# Patient Record
Sex: FEMALE | Race: WHITE | NOT HISPANIC OR LATINO | Employment: OTHER | URBAN - METROPOLITAN AREA
[De-identification: names, ages, dates, MRNs, and addresses within clinical notes are randomized per-mention and may not be internally consistent; named-entity substitution may affect disease eponyms.]

---

## 2022-08-10 ENCOUNTER — APPOINTMENT (OUTPATIENT)
Dept: RADIOLOGY | Facility: MEDICAL CENTER | Age: 72
DRG: 116 | End: 2022-08-10
Attending: EMERGENCY MEDICINE
Payer: MEDICARE

## 2022-08-10 ENCOUNTER — APPOINTMENT (OUTPATIENT)
Dept: RADIOLOGY | Facility: MEDICAL CENTER | Age: 72
DRG: 116 | End: 2022-08-10
Attending: HOSPITALIST
Payer: MEDICARE

## 2022-08-10 ENCOUNTER — APPOINTMENT (OUTPATIENT)
Dept: RADIOLOGY | Facility: MEDICAL CENTER | Age: 72
DRG: 116 | End: 2022-08-10
Attending: STUDENT IN AN ORGANIZED HEALTH CARE EDUCATION/TRAINING PROGRAM
Payer: MEDICARE

## 2022-08-10 ENCOUNTER — HOSPITAL ENCOUNTER (INPATIENT)
Facility: MEDICAL CENTER | Age: 72
LOS: 5 days | DRG: 116 | End: 2022-08-15
Attending: EMERGENCY MEDICINE | Admitting: STUDENT IN AN ORGANIZED HEALTH CARE EDUCATION/TRAINING PROGRAM
Payer: MEDICARE

## 2022-08-10 DIAGNOSIS — R41.89 THOUGHT DISORDER: ICD-10-CM

## 2022-08-10 DIAGNOSIS — H54.62 VISION LOSS OF LEFT EYE: ICD-10-CM

## 2022-08-10 DIAGNOSIS — H44.002: ICD-10-CM

## 2022-08-10 DIAGNOSIS — H57.12 LEFT EYE PAIN: ICD-10-CM

## 2022-08-10 DIAGNOSIS — H44.002 LEFT ENDOPHTHALMIA: ICD-10-CM

## 2022-08-10 DIAGNOSIS — F22 DELUSIONAL DISORDER (HCC): ICD-10-CM

## 2022-08-10 PROBLEM — F15.10 STIMULANT ABUSE (HCC): Status: ACTIVE | Noted: 2022-08-10

## 2022-08-10 PROBLEM — S05.02XA ABRASION OF LEFT CORNEA: Status: ACTIVE | Noted: 2022-08-10

## 2022-08-10 LAB
ALBUMIN SERPL BCP-MCNC: 3.8 G/DL (ref 3.2–4.9)
ALBUMIN/GLOB SERPL: 1.4 G/DL
ALP SERPL-CCNC: 108 U/L (ref 30–99)
ALT SERPL-CCNC: 14 U/L (ref 2–50)
AMPHET UR QL SCN: POSITIVE
ANION GAP SERPL CALC-SCNC: 10 MMOL/L (ref 7–16)
APPEARANCE UR: CLEAR
APTT PPP: 25.1 SEC (ref 24.7–36)
AST SERPL-CCNC: 16 U/L (ref 12–45)
BACTERIA #/AREA URNS HPF: ABNORMAL /HPF
BARBITURATES UR QL SCN: NEGATIVE
BASOPHILS # BLD AUTO: 0.4 % (ref 0–1.8)
BASOPHILS # BLD: 0.03 K/UL (ref 0–0.12)
BENZODIAZ UR QL SCN: NEGATIVE
BILIRUB SERPL-MCNC: 0.6 MG/DL (ref 0.1–1.5)
BILIRUB UR QL STRIP.AUTO: NEGATIVE
BUN SERPL-MCNC: 12 MG/DL (ref 8–22)
BZE UR QL SCN: NEGATIVE
CALCIUM SERPL-MCNC: 8.9 MG/DL (ref 8.5–10.5)
CANNABINOIDS UR QL SCN: NEGATIVE
CHLORIDE SERPL-SCNC: 103 MMOL/L (ref 96–112)
CHOLEST SERPL-MCNC: 167 MG/DL (ref 100–199)
CO2 SERPL-SCNC: 28 MMOL/L (ref 20–33)
COLOR UR: YELLOW
CREAT SERPL-MCNC: 0.7 MG/DL (ref 0.5–1.4)
CRP SERPL HS-MCNC: 0.5 MG/DL (ref 0–0.75)
EKG IMPRESSION: NORMAL
EOSINOPHIL # BLD AUTO: 0.05 K/UL (ref 0–0.51)
EOSINOPHIL NFR BLD: 0.7 % (ref 0–6.9)
EPI CELLS #/AREA URNS HPF: ABNORMAL /HPF
ERYTHROCYTE [DISTWIDTH] IN BLOOD BY AUTOMATED COUNT: 47.2 FL (ref 35.9–50)
EST. AVERAGE GLUCOSE BLD GHB EST-MCNC: 126 MG/DL
GFR SERPLBLD CREATININE-BSD FMLA CKD-EPI: 93 ML/MIN/1.73 M 2
GLOBULIN SER CALC-MCNC: 2.7 G/DL (ref 1.9–3.5)
GLUCOSE SERPL-MCNC: 81 MG/DL (ref 65–99)
GLUCOSE UR STRIP.AUTO-MCNC: NEGATIVE MG/DL
HBA1C MFR BLD: 6 % (ref 4–5.6)
HCT VFR BLD AUTO: 41 % (ref 37–47)
HDLC SERPL-MCNC: 74 MG/DL
HGB BLD-MCNC: 13.2 G/DL (ref 12–16)
HIV 1+2 AB+HIV1 P24 AG SERPL QL IA: NORMAL
HYALINE CASTS #/AREA URNS LPF: ABNORMAL /LPF
IMM GRANULOCYTES # BLD AUTO: 0.02 K/UL (ref 0–0.11)
IMM GRANULOCYTES NFR BLD AUTO: 0.3 % (ref 0–0.9)
INR PPP: 1.09 (ref 0.87–1.13)
KETONES UR STRIP.AUTO-MCNC: NEGATIVE MG/DL
LDLC SERPL CALC-MCNC: 79 MG/DL
LEUKOCYTE ESTERASE UR QL STRIP.AUTO: ABNORMAL
LYMPHOCYTES # BLD AUTO: 2.21 K/UL (ref 1–4.8)
LYMPHOCYTES NFR BLD: 29.8 % (ref 22–41)
MCH RBC QN AUTO: 29.9 PG (ref 27–33)
MCHC RBC AUTO-ENTMCNC: 32.2 G/DL (ref 33.6–35)
MCV RBC AUTO: 93 FL (ref 81.4–97.8)
METHADONE UR QL SCN: NEGATIVE
MICRO URNS: ABNORMAL
MONOCYTES # BLD AUTO: 0.52 K/UL (ref 0–0.85)
MONOCYTES NFR BLD AUTO: 7 % (ref 0–13.4)
NEUTROPHILS # BLD AUTO: 4.58 K/UL (ref 2–7.15)
NEUTROPHILS NFR BLD: 61.8 % (ref 44–72)
NITRITE UR QL STRIP.AUTO: NEGATIVE
NRBC # BLD AUTO: 0 K/UL
NRBC BLD-RTO: 0 /100 WBC
OPIATES UR QL SCN: NEGATIVE
OXYCODONE UR QL SCN: NEGATIVE
PCP UR QL SCN: NEGATIVE
PH UR STRIP.AUTO: 6.5 [PH] (ref 5–8)
PLATELET # BLD AUTO: 391 K/UL (ref 164–446)
PMV BLD AUTO: 8.4 FL (ref 9–12.9)
POTASSIUM SERPL-SCNC: 3.9 MMOL/L (ref 3.6–5.5)
PROPOXYPH UR QL SCN: NEGATIVE
PROT SERPL-MCNC: 6.5 G/DL (ref 6–8.2)
PROT UR QL STRIP: NEGATIVE MG/DL
PROTHROMBIN TIME: 14 SEC (ref 12–14.6)
RBC # BLD AUTO: 4.41 M/UL (ref 4.2–5.4)
RBC # URNS HPF: ABNORMAL /HPF
RBC UR QL AUTO: NEGATIVE
SCCMEC + MECA PNL NOSE NAA+PROBE: NEGATIVE
SODIUM SERPL-SCNC: 141 MMOL/L (ref 135–145)
SP GR UR STRIP.AUTO: 1.01
T PALLIDUM AB SER QL IA: NORMAL
TRIGL SERPL-MCNC: 71 MG/DL (ref 0–149)
TSH SERPL DL<=0.005 MIU/L-ACNC: 2.78 UIU/ML (ref 0.38–5.33)
UFH PPP CHRO-ACNC: <0.1 IU/ML
UROBILINOGEN UR STRIP.AUTO-MCNC: 0.2 MG/DL
VIT B12 SERPL-MCNC: 428 PG/ML (ref 211–911)
WBC # BLD AUTO: 7.4 K/UL (ref 4.8–10.8)
WBC #/AREA URNS HPF: ABNORMAL /HPF

## 2022-08-10 PROCEDURE — 80307 DRUG TEST PRSMV CHEM ANLYZR: CPT

## 2022-08-10 PROCEDURE — 99223 1ST HOSP IP/OBS HIGH 75: CPT | Mod: GC | Performed by: PSYCHIATRY & NEUROLOGY

## 2022-08-10 PROCEDURE — 87641 MR-STAPH DNA AMP PROBE: CPT

## 2022-08-10 PROCEDURE — 84443 ASSAY THYROID STIM HORMONE: CPT

## 2022-08-10 PROCEDURE — 96365 THER/PROPH/DIAG IV INF INIT: CPT | Mod: XU

## 2022-08-10 PROCEDURE — A9270 NON-COVERED ITEM OR SERVICE: HCPCS | Performed by: PSYCHIATRY & NEUROLOGY

## 2022-08-10 PROCEDURE — 85025 COMPLETE CBC W/AUTO DIFF WBC: CPT

## 2022-08-10 PROCEDURE — 70450 CT HEAD/BRAIN W/O DYE: CPT

## 2022-08-10 PROCEDURE — 85520 HEPARIN ASSAY: CPT

## 2022-08-10 PROCEDURE — 700111 HCHG RX REV CODE 636 W/ 250 OVERRIDE (IP): Performed by: EMERGENCY MEDICINE

## 2022-08-10 PROCEDURE — 70481 CT ORBIT/EAR/FOSSA W/DYE: CPT

## 2022-08-10 PROCEDURE — 700117 HCHG RX CONTRAST REV CODE 255: Performed by: EMERGENCY MEDICINE

## 2022-08-10 PROCEDURE — 86780 TREPONEMA PALLIDUM: CPT

## 2022-08-10 PROCEDURE — 700101 HCHG RX REV CODE 250: Performed by: OPHTHALMOLOGY

## 2022-08-10 PROCEDURE — 770001 HCHG ROOM/CARE - MED/SURG/GYN PRIV*

## 2022-08-10 PROCEDURE — 85730 THROMBOPLASTIN TIME PARTIAL: CPT

## 2022-08-10 PROCEDURE — 86140 C-REACTIVE PROTEIN: CPT

## 2022-08-10 PROCEDURE — 96366 THER/PROPH/DIAG IV INF ADDON: CPT

## 2022-08-10 PROCEDURE — 99285 EMERGENCY DEPT VISIT HI MDM: CPT

## 2022-08-10 PROCEDURE — 81001 URINALYSIS AUTO W/SCOPE: CPT | Mod: XU

## 2022-08-10 PROCEDURE — 700111 HCHG RX REV CODE 636 W/ 250 OVERRIDE (IP): Performed by: HOSPITALIST

## 2022-08-10 PROCEDURE — 80053 COMPREHEN METABOLIC PANEL: CPT

## 2022-08-10 PROCEDURE — 700105 HCHG RX REV CODE 258: Performed by: EMERGENCY MEDICINE

## 2022-08-10 PROCEDURE — 82607 VITAMIN B-12: CPT

## 2022-08-10 PROCEDURE — 700102 HCHG RX REV CODE 250 W/ 637 OVERRIDE(OP): Performed by: PSYCHIATRY & NEUROLOGY

## 2022-08-10 PROCEDURE — 80061 LIPID PANEL: CPT

## 2022-08-10 PROCEDURE — 36415 COLL VENOUS BLD VENIPUNCTURE: CPT

## 2022-08-10 PROCEDURE — 93005 ELECTROCARDIOGRAM TRACING: CPT | Performed by: STUDENT IN AN ORGANIZED HEALTH CARE EDUCATION/TRAINING PROGRAM

## 2022-08-10 PROCEDURE — 85610 PROTHROMBIN TIME: CPT

## 2022-08-10 PROCEDURE — 87389 HIV-1 AG W/HIV-1&-2 AB AG IA: CPT

## 2022-08-10 PROCEDURE — 96375 TX/PRO/DX INJ NEW DRUG ADDON: CPT | Mod: XU

## 2022-08-10 PROCEDURE — 83036 HEMOGLOBIN GLYCOSYLATED A1C: CPT

## 2022-08-10 PROCEDURE — 700111 HCHG RX REV CODE 636 W/ 250 OVERRIDE (IP): Performed by: OPHTHALMOLOGY

## 2022-08-10 PROCEDURE — 99223 1ST HOSP IP/OBS HIGH 75: CPT | Mod: GC | Performed by: STUDENT IN AN ORGANIZED HEALTH CARE EDUCATION/TRAINING PROGRAM

## 2022-08-10 RX ORDER — HALOPERIDOL 5 MG/ML
5 INJECTION INTRAMUSCULAR ONCE
Status: COMPLETED | OUTPATIENT
Start: 2022-08-10 | End: 2022-08-10

## 2022-08-10 RX ORDER — MOXIFLOXACIN 5 MG/ML
1 SOLUTION/ DROPS OPHTHALMIC
Status: DISPENSED | OUTPATIENT
Start: 2022-08-10 | End: 2022-08-12

## 2022-08-10 RX ORDER — HEPARIN SODIUM 1000 [USP'U]/ML
80 INJECTION, SOLUTION INTRAVENOUS; SUBCUTANEOUS ONCE
Status: COMPLETED | OUTPATIENT
Start: 2022-08-10 | End: 2022-08-10

## 2022-08-10 RX ORDER — HEPARIN SODIUM 1000 [USP'U]/ML
40 INJECTION, SOLUTION INTRAVENOUS; SUBCUTANEOUS PRN
Status: DISCONTINUED | OUTPATIENT
Start: 2022-08-10 | End: 2022-08-11

## 2022-08-10 RX ORDER — OXYCODONE HYDROCHLORIDE 5 MG/1
5-10 TABLET ORAL EVERY 4 HOURS PRN
Status: DISCONTINUED | OUTPATIENT
Start: 2022-08-10 | End: 2022-08-15 | Stop reason: HOSPADM

## 2022-08-10 RX ORDER — HEPARIN SODIUM 5000 [USP'U]/100ML
0-30 INJECTION, SOLUTION INTRAVENOUS CONTINUOUS
Status: DISCONTINUED | OUTPATIENT
Start: 2022-08-10 | End: 2022-08-11

## 2022-08-10 RX ORDER — ACETAZOLAMIDE 500 MG/5ML
500 INJECTION, POWDER, LYOPHILIZED, FOR SOLUTION INTRAVENOUS 2 TIMES DAILY
Status: DISPENSED | OUTPATIENT
Start: 2022-08-10 | End: 2022-08-12

## 2022-08-10 RX ORDER — ONDANSETRON 2 MG/ML
4 INJECTION INTRAMUSCULAR; INTRAVENOUS EVERY 4 HOURS PRN
Status: DISCONTINUED | OUTPATIENT
Start: 2022-08-10 | End: 2022-08-15 | Stop reason: HOSPADM

## 2022-08-10 RX ORDER — ACETAMINOPHEN 325 MG/1
650 TABLET ORAL EVERY 4 HOURS PRN
Status: DISCONTINUED | OUTPATIENT
Start: 2022-08-10 | End: 2022-08-15 | Stop reason: HOSPADM

## 2022-08-10 RX ORDER — ACETAZOLAMIDE 500 MG/5ML
500 INJECTION, POWDER, LYOPHILIZED, FOR SOLUTION INTRAVENOUS ONCE
Status: DISCONTINUED | OUTPATIENT
Start: 2022-08-10 | End: 2022-08-10

## 2022-08-10 RX ORDER — SODIUM CHLORIDE 9 MG/ML
1000 INJECTION, SOLUTION INTRAVENOUS ONCE
Status: COMPLETED | OUTPATIENT
Start: 2022-08-10 | End: 2022-08-10

## 2022-08-10 RX ORDER — HALOPERIDOL 5 MG/ML
5 INJECTION INTRAMUSCULAR EVERY 6 HOURS PRN
Status: DISCONTINUED | OUTPATIENT
Start: 2022-08-10 | End: 2022-08-15 | Stop reason: HOSPADM

## 2022-08-10 RX ORDER — MOXIFLOXACIN 5 MG/ML
1 SOLUTION/ DROPS OPHTHALMIC
Status: DISCONTINUED | OUTPATIENT
Start: 2022-08-10 | End: 2022-08-10

## 2022-08-10 RX ORDER — ARIPIPRAZOLE 10 MG/1
5 TABLET ORAL DAILY
Status: DISCONTINUED | OUTPATIENT
Start: 2022-08-10 | End: 2022-08-15 | Stop reason: HOSPADM

## 2022-08-10 RX ORDER — HEPARIN SODIUM 5000 [USP'U]/100ML
0-30 INJECTION, SOLUTION INTRAVENOUS CONTINUOUS
Status: DISCONTINUED | OUTPATIENT
Start: 2022-08-10 | End: 2022-08-10

## 2022-08-10 RX ADMIN — ARIPIPRAZOLE 5 MG: 10 TABLET ORAL at 14:21

## 2022-08-10 RX ADMIN — MOXIFLOXACIN HYDROCHLORIDE 1 DROP: 5 SOLUTION/ DROPS OPHTHALMIC at 14:45

## 2022-08-10 RX ADMIN — ACETAZOLAMIDE 500 MG: 500 INJECTION, POWDER, LYOPHILIZED, FOR SOLUTION INTRAVENOUS at 15:16

## 2022-08-10 RX ADMIN — HALOPERIDOL LACTATE 5 MG: 5 INJECTION, SOLUTION INTRAMUSCULAR at 03:45

## 2022-08-10 RX ADMIN — SODIUM CHLORIDE 1000 ML: 9 INJECTION, SOLUTION INTRAVENOUS at 03:45

## 2022-08-10 RX ADMIN — HEPARIN SODIUM 5100 UNITS: 1000 INJECTION, SOLUTION INTRAVENOUS; SUBCUTANEOUS at 22:41

## 2022-08-10 RX ADMIN — HEPARIN SODIUM 18 UNITS/KG/HR: 5000 INJECTION, SOLUTION INTRAVENOUS at 22:15

## 2022-08-10 RX ADMIN — CEFTRIAXONE SODIUM 2 G: 10 INJECTION, POWDER, FOR SOLUTION INTRAVENOUS at 03:51

## 2022-08-10 RX ADMIN — VANCOMYCIN HYDROCHLORIDE 1500 MG: 500 INJECTION, POWDER, LYOPHILIZED, FOR SOLUTION INTRAVENOUS at 04:32

## 2022-08-10 RX ADMIN — IOHEXOL 70 ML: 350 INJECTION, SOLUTION INTRAVENOUS at 05:47

## 2022-08-10 ASSESSMENT — LIFESTYLE VARIABLES
HAVE PEOPLE ANNOYED YOU BY CRITICIZING YOUR DRINKING: NO
EVER HAD A DRINK FIRST THING IN THE MORNING TO STEADY YOUR NERVES TO GET RID OF A HANGOVER: YES
EVER FELT BAD OR GUILTY ABOUT YOUR DRINKING: NO
DOES PATIENT WANT TO STOP DRINKING: NO
ALCOHOL_USE: YES
TOTAL SCORE: 1
CONSUMPTION TOTAL: INCOMPLETE
TOTAL SCORE: 1
TOTAL SCORE: 1
HAVE YOU EVER FELT YOU SHOULD CUT DOWN ON YOUR DRINKING: NO

## 2022-08-10 ASSESSMENT — ENCOUNTER SYMPTOMS
COUGH: 0
FEVER: 0
PALPITATIONS: 0
DIZZINESS: 0
SORE THROAT: 0
PHOTOPHOBIA: 0
EYE PAIN: 1
VOMITING: 0
EYE PAIN: 0
BLURRED VISION: 1
CONSTIPATION: 0
HEADACHES: 0
EYE REDNESS: 1
ABDOMINAL PAIN: 0
EYE DISCHARGE: 1
BLOOD IN STOOL: 0
SHORTNESS OF BREATH: 0
HEMOPTYSIS: 0
PHOTOPHOBIA: 1
DIARRHEA: 0
NAUSEA: 0
LOSS OF CONSCIOUSNESS: 0
DOUBLE VISION: 0
TREMORS: 0
CHILLS: 0
BACK PAIN: 0

## 2022-08-10 ASSESSMENT — PAIN DESCRIPTION - PAIN TYPE: TYPE: ACUTE PAIN

## 2022-08-10 ASSESSMENT — COGNITIVE AND FUNCTIONAL STATUS - GENERAL
SUGGESTED CMS G CODE MODIFIER DAILY ACTIVITY: CH
SUGGESTED CMS G CODE MODIFIER MOBILITY: CI
WALKING IN HOSPITAL ROOM: A LITTLE
DAILY ACTIVITIY SCORE: 24
MOBILITY SCORE: 23

## 2022-08-10 ASSESSMENT — PATIENT HEALTH QUESTIONNAIRE - PHQ9
1. LITTLE INTEREST OR PLEASURE IN DOING THINGS: NOT AT ALL
SUM OF ALL RESPONSES TO PHQ9 QUESTIONS 1 AND 2: 0
2. FEELING DOWN, DEPRESSED, IRRITABLE, OR HOPELESS: NOT AT ALL

## 2022-08-10 ASSESSMENT — FIBROSIS 4 INDEX: FIB4 SCORE: 0.77

## 2022-08-10 ASSESSMENT — PAIN DESCRIPTION - DESCRIPTORS: DESCRIPTORS: ACHING

## 2022-08-10 NOTE — CONSULTS
"Ophthalmology Consults   CC: Loss of vision left eye    HPI : 72-year-old female who initially presented to the [facility] with loss of vision in her left eye. Pt reports loss of vision her left eye several months ago due to \"parasite in her eye.\" Denies any prior eye surgeries and procedures. No issues in the right eye.         Imaging:    CT Orbits  IMPRESSION:     1.  Relative hyperenhancement of the left superior ophthalmic vein could reflect caroticocavernous fistula or thrombosis of the right superior ophthalmic vein.  2.  Gas throughout multiple vascular structures likely related to aggressive IV access.  3.  6 mm gas collection between the lateral and inferior rectus muscles in the left orbit could represent infection or large collection of gas within a vessel.  4.  Globes are symmetric.      Eye exam:  Pt is very sleepy with poor effort     VA near card 20/60 OD           HM at face OS  EOM Full OD with some restriction OS in all directions  VF to CF Full OD  Not able OS       External exam.   L/L WNL OD, +1 proptosis OS       C/S White and quite OD,  +3 chemosis with injection OS  Cornea: Clear OD, microcystic edema with several bullae and large central epi defect OS  Iris: flat and round OU  A/C: formed OD large formed hypopyon      DFE OU:  OD: WNL  OS: No view               A/P:  1. Loss of vision, left eye  - uncrear etiology  - will proceed with close observation.  -given significant microcystic edema and bullae will start on Diamox 500mg PO BID    2. Corneal abrasion, left eye  -will start with Vigamox Q2H     3. Possible fistula or thrombosis of the right superior ophthalmic vein found on CT  -will proceed with MRV      Will re-evaluet pt tmrw       -   "

## 2022-08-10 NOTE — ASSESSMENT & PLAN NOTE
UTox + amphetamines  Clinically is having some withdrawal sx's  Cont to manage supportively  Psych following

## 2022-08-10 NOTE — ASSESSMENT & PLAN NOTE
Per chart, patient concerned about parasitic infections of abdomen, eyes, body  -labs not suggestive of active parasitic infective process  PLAN  -psychiatric precautions, no sharps when diet eventually initiated  -On legal hold.  Will be transferred to inpatient psych once medically clear

## 2022-08-10 NOTE — CONSULTS
Alert Team:    Patient to be admitted to hospital; IP Psychiatry Consult ordered for further f/u later this morning.

## 2022-08-10 NOTE — H&P
History & Physical Note    Date of Admission: 8/10/2022  Admission Status: Inpatient  [facility] Team: ABDELRAHMAN  Attending: Margarito Parisi M.D.   Senior Resident: Dr. Scherer  Contact Number: [phone]    Chief Complaint: Left eye pain/ vision loss    History of Present Illness (HPI):   Mora is a 72 y.o. female who presented 8/10/2022 with left eye pain and vision loss. History taking is limited as patient received haldol prior to interview and was minimally responsive. History from chart review.     Per ED notes patient was concerned that she had parasitic infections of her skin, abdomen and eyes and that over the course of this past week had vision loss in th left eye. Per chart patient had been washing her eye out using a lice shampoo.   She was given doses of vancomycin and ceftriaxone.  While in the ED ophthalmology was consulted, per ED notes they recommended continue empiric antibiotics , keep patient NPO with plan for OR optho in the AM, imaging ordered.    Review of Systems:   Review of Systems   Unable to perform ROS: Patient unresponsive       Past Medical History:   Past Medical History was not reviewed with patient.   has a past medical history of Psychiatric disorder.    Past Surgical History: Past Surgical History was not reviewed with patient.   has no past surgical history on file.    Medications: Medications have been not reviewed with patient.  None        Allergies: Allergies have been not reviewed with patient.  Allergies   Allergen Reactions    Augmentin [Amoxicillin-Pot Clavulanate] Anaphylaxis    Flagyl [Metronidazole] Anaphylaxis    Miralax [Polyethylene Glycol] Anaphylaxis       Family History:   family history is not on file.     Social History:   Patient not responsive    Physical Exam:   Vitals:  Temp:  [36.4 °C (97.5 °F)] 36.4 °C (97.5 °F)  Pulse:  [73] 73  Resp:  [17] 17  BP: (142)/(70) 142/70  SpO2:  [95 %] 95 %    Physical Exam  Vitals and nursing note reviewed.   Constitutional:        Comments: Status post haldol, minimally responsive, arouses slightly to voice before falling back asleep   HENT:      Head: Normocephalic and atraumatic.   Eyes:      Comments: Left eye erythematous, hypopyon present on left eye.   Cardiovascular:      Rate and Rhythm: Normal rate.   Pulmonary:      Effort: Pulmonary effort is normal. No respiratory distress.   Abdominal:      Palpations: Abdomen is soft. There is no mass.   Musculoskeletal:      Right lower leg: No edema.      Left lower leg: No edema.   Skin:     General: Skin is warm and dry.   Neurological:      Comments: Minimally responsive, rouses briefly to name, does not follow commands       Labs:    Latest Reference Range & Units 8/10/22 03:04   WBC 4.8 - 10.8 K/uL 7.4   RBC 4.20 - 5.40 M/uL 4.41   Hemoglobin 12.0 - 16.0 g/dL 13.2   Hematocrit 37.0 - 47.0 % 41.0   MCV 81.4 - 97.8 fL 93.0   MCH 27.0 - 33.0 pg 29.9   MCHC 33.6 - 35.0 g/dL 32.2 (L)   RDW 35.9 - 50.0 fL 47.2   Platelet Count 164 - 446 K/uL 391   MPV 9.0 - 12.9 fL 8.4 (L)   Neutrophils-Polys 44.00 - 72.00 % 61.80   Neutrophils (Absolute) 2.00 - 7.15 K/uL 4.58   Lymphocytes 22.00 - 41.00 % 29.80   Lymphs (Absolute) 1.00 - 4.80 K/uL 2.21   Monocytes 0.00 - 13.40 % 7.00   Monos (Absolute) 0.00 - 0.85 K/uL 0.52   Eosinophils 0.00 - 6.90 % 0.70   Eos (Absolute) 0.00 - 0.51 K/uL 0.05   Basophils 0.00 - 1.80 % 0.40   Baso (Absolute) 0.00 - 0.12 K/uL 0.03   Immature Granulocytes 0.00 - 0.90 % 0.30   Immature Granulocytes (abs) 0.00 - 0.11 K/uL 0.02   Nucleated RBC /100 WBC 0.00   NRBC (Absolute) K/uL 0.00   Sodium 135 - 145 mmol/L 141   Potassium 3.6 - 5.5 mmol/L 3.9   Chloride 96 - 112 mmol/L 103   Co2 20 - 33 mmol/L 28   Anion Gap 7.0 - 16.0  10.0   Glucose 65 - 99 mg/dL 81   Bun 8 - 22 mg/dL 12   Creatinine 0.50 - 1.40 mg/dL 0.70   GFR (CKD-EPI) >60 mL/min/1.73 m 2 93   Calcium 8.5 - 10.5 mg/dL 8.9   AST(SGOT) 12 - 45 U/L 16   ALT(SGPT) 2 - 50 U/L 14   Alkaline Phosphatase 30 - 99 U/L 108 (H)    Total Bilirubin 0.1 - 1.5 mg/dL 0.6   Albumin 3.2 - 4.9 g/dL 3.8   Total Protein 6.0 - 8.2 g/dL 6.5   Globulin 1.9 - 3.5 g/dL 2.7   A-G Ratio g/dL 1.4   Stat C-Reactive Protein 0.00 - 0.75 mg/dL 0.50   (L): Data is abnormally low  (H): Data is abnormally high    Imaging:   [id number] WITH PLUS RECONS    (Results Pending)       Previous Data Review: reviewed    Problem Representation: 72 year old woman with recent history of lice shampoo exposure to eye in the context of patient concerns of parasitic infection of her eyes, concerning for possible endophthalmitis.     * Endophthalmia, left- (present on admission)  Assessment & Plan  -history of reported lice shampoo exposure to eye in context of thoughts of parasites in her eyes  -received vancomycin and ceftriaxone in the ED  PLAN  -Optho onboard  -NPO  -CT orbits pending  -continue vancomycin, defer decision on additional doses of ceftriaxone following OR  -will likely require intravitreal antibiotics, defer to optho    Thought disorder  Assessment & Plan  Per chart, patient concerned about parasitic infections of abdomen, eyes, body  -labs not suggestive of active parasitic infective process  PLAN  -psychiatric precautions, no sharps when diet eventually initiated
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
## 2022-08-10 NOTE — CONSULTS
BRIEF PSYCHIATRY NOTE:full note to follow    Pt injured her eye/vision with lice shampoo in the context of delusional parasitosis. Pt's UDS is also positive for amphetamine. She also admitted recent use of psychedelics. Pt at this time has an acute elevated risk for danger to self due to ongoing psychosis and needs psychiatric stabilization for her safety.      Dx: delusional parasitosis  Methamphetamine intoxication/withdrawal  R/o substance induced psychotic disorder    Plan:  Legal hold initiated and extended  Start Abilify 5mg Po daily to target psychosis  Please transfer pt to inpatient psychiatric hospital once she is medically cleared and a bed is available  Psychiatry will follow      Sitter: yes   Phone: yes  Visitors: yes  Personal belongings: no

## 2022-08-10 NOTE — ED TRIAGE NOTES
Chief Complaint   Patient presents with    Eye Pain     Pt bib ems from [facility] for pt having L eye pain/vision loss for a few weeks now d/t pt washing her eye out with lice shampoo because she feels there are bugs in her eye.

## 2022-08-10 NOTE — PROGRESS NOTES
[facility] Medicine Daily Progress Note    Date of Service  8/10/2022    Chief Complaint  Mora Guzman is a 72 y.o. female admitted 8/10/2022 with     Hospital Course  72-year-old female who initially presented to the [facility].  Patient with previously psychiatric diagnoses however history is difficult to obtain as she is not a good historian and further records are unavailable.  Unclear if pt has actually had lice or delusions of parasitosis.  Hx of amphetamine abuse.   presented with complaint of eye pain after using lice shampoo.  In ED found to have endophthalmitis.  Ophthalmology consulted, and started on empiric antibiotics.    Interval Problem Update  Pt ROS is limited by underlying psychiatric Dx.  She initially has no complaints but when I ask her about her vision states she can only see bright lights and some shadows from her L eye.  She denies pain however    DW Opthalmology Dr Carlson    I have discussed this patient's plan of care and discharge plan at IDT rounds today with Case Management, Nursing, Nursing leadership, and other members of the IDT team.    Consultants/Specialty  ophthalmology    Code Status  Full Code    Disposition  Patient is not medically cleared for discharge.   Anticipate discharge to to home with close outpatient follow-up.  I have placed the appropriate orders for post-discharge needs.    Review of Systems  Review of Systems   Unable to perform ROS: Psychiatric disorder   Constitutional:  Negative for chills and fever.   HENT:  Negative for nosebleeds and sore throat.    Eyes:  Positive for blurred vision, discharge and redness. Negative for double vision, photophobia and pain.   Respiratory:  Negative for cough and shortness of breath.    Cardiovascular:  Negative for chest pain, palpitations and leg swelling.   Gastrointestinal:  Negative for abdominal pain, diarrhea, nausea and vomiting.   Genitourinary:  Negative for dysuria and urgency.   Musculoskeletal:   Negative for back pain.   Skin:  Negative for rash.   Neurological:  Negative for dizziness, loss of consciousness and headaches.      Physical Exam  Temp:  [36.4 °C (97.5 °F)] 36.4 °C (97.5 °F)  Pulse:  [66-73] 69  Resp:  [17] 17  BP: (142-181)/(70-85) 181/85  SpO2:  [88 %-95 %] 88 %    Physical Exam  Constitutional:       General: She is not in acute distress.     Appearance: She is well-developed. She is not diaphoretic.   HENT:      Head: Normocephalic and atraumatic.   Eyes:      Comments: R eye benign  L eye: some protrusion, chemosis, visible hypopion.  Limited ROM but no apparent discomfort   Neck:      Vascular: No JVD.   Cardiovascular:      Rate and Rhythm: Normal rate and regular rhythm.   Pulmonary:      Effort: Pulmonary effort is normal. No respiratory distress.      Breath sounds: No stridor. No wheezing or rales.   Abdominal:      Palpations: Abdomen is soft.      Tenderness: There is no abdominal tenderness. There is no guarding or rebound.   Musculoskeletal:         General: No tenderness.      Right lower leg: No edema.      Left lower leg: No edema.   Skin:     General: Skin is warm and dry.      Findings: No rash.   Neurological:      General: No focal deficit present.      Mental Status: She is alert and oriented to person, place, and time.      Comments: Somnolent  Rouses to physical stim is then alert and interactive.    Non focal exam       Fluids  No intake or output data in the 24 hours ending 08/10/22 0743    Laboratory  Recent Labs     08/10/22  0304   WBC 7.4   RBC 4.41   HEMOGLOBIN 13.2   HEMATOCRIT 41.0   MCV 93.0   MCH 29.9   MCHC 32.2*   RDW 47.2   PLATELETCT 391   MPV 8.4*     Recent Labs     08/10/22  0304   SODIUM 141   POTASSIUM 3.9   CHLORIDE 103   CO2 28   GLUCOSE 81   BUN 12   CREATININE 0.70   CALCIUM 8.9                   Imaging  [id number] WITH PLUS RECONS   Final Result      1.  Relative hyperenhancement of the left superior ophthalmic vein could reflect  caroticocavernous fistula or thrombosis of the right superior ophthalmic vein.   2.  Gas throughout multiple vascular structures likely related to aggressive IV access.   3.  6 mm gas collection between the lateral and inferior rectus muscles in the left orbit could represent infection or large collection of gas within a vessel.   4.  Globes are symmetric.      MR-VENOGRAM (MRV) HEAD    (Results Pending)        Assessment/Plan  * Endophthalmia, left- (present on admission)  Assessment & Plan  -history of reported lice shampoo exposure to eye in context of thoughts of parasites in her eyes  -received vancomycin and ceftriaxone in the ED  PLAN  -Optho consulted: appreciate their evaluation and recommendations  -NPO  -CT orbits showing possible thrombosis of the R sup opthalmic vein, probable retro-orbital infection  -continue vancomycin, defer decision on additional doses of ceftriaxone following OR  -will likely require intravitreal antibiotics, defer to optho  -MRV brain ordered    Abrasion of left cornea  Assessment & Plan  Topical Abxs  Opthalmology following    Stimulant abuse (HCC)  Assessment & Plan  UTox + amphetamines  Clinically is having some withdrawal sx's  Cont to manage supportively  Psych following    Thought disorder  Assessment & Plan  Per chart, patient concerned about parasitic infections of abdomen, eyes, body  -labs not suggestive of active parasitic infective process  PLAN  -psychiatric precautions, no sharps when diet eventually initiated       VTE prophylaxis: SCDs/TEDs    I have performed a physical exam and reviewed and updated ROS and Plan today (8/10/2022). In review of yesterday's note (8/9/2022), there are no changes except as documented above.

## 2022-08-10 NOTE — ED PROVIDER NOTES
"ED Provider Note    CHIEF COMPLAINT  Chief Complaint   Patient presents with    Eye Pain     Pt bib ems from [facility] for pt having L eye pain/vision loss for a few weeks now d/t pt washing her eye out with lice shampoo because she feels there are bugs in her eye.        HPI  Patient is a 72-year-old female who presents emergency room as a transfer from [facility] for patient having left eye pain and vision loss.  Patient is noted on initial assessment to be very scattered and tangential with fixation on likelihood of frequent parasites of her skin, abdomen and eyes.  As noted in the outside notes that she had been seen on several occurrences over the last month for similar complaints and recently have been seen over the course of a week for vision loss and eye irritation.  There have been attempts to get her to be hospitalized and transferred which were unsuccessful.  She had worsening eye pain and came in today concerned that \"I have I bugs.\"    She is denying any fevers, denies any headaches, neck pain, chest pain, shortness of breath or abdominal discomfort.  No nausea or vomiting.    PPE Note: I personally donned full PPE for all patient encounters during this visit, including being clean-shaven with an N95 respirator mask, gloves, and goggles.     REVIEW OF SYSTEMS  See HPI for further details. All other systems are negative.     PAST MEDICAL HISTORY   has a past medical history of Psychiatric disorder.    SOCIAL HISTORY  Social History     Tobacco Use    Smoking status: Former     Types: Cigarettes     Passive exposure: Never    Smokeless tobacco: Never   Vaping Use    Vaping Use: Never used   Substance and Sexual Activity    Alcohol use: Yes    Drug use: Yes     Comment: marijuana, lsd, meth, mushrooms    Sexual activity: Not on file       SURGICAL HISTORY  patient denies any surgical history    CURRENT MEDICATIONS  Home Medications       Reviewed by Ed Nicholas R.N. (Registered Nurse) " "on 08/10/22 at 0325  Med List Status: Not Addressed     Medication Last Dose Status        Patient Orlando Taking any Medications                           ALLERGIES  Allergies   Allergen Reactions    Augmentin [Amoxicillin-Pot Clavulanate] Anaphylaxis    Flagyl [Metronidazole] Anaphylaxis    Miralax [Polyethylene Glycol] Anaphylaxis       PHYSICAL EXAM  VITAL SIGNS: BP (!) 142/70   Pulse 73   Temp 36.4 °C (97.5 °F) (Temporal)   Resp 17   Ht 1.676 m (5' 6\")   Wt 63.5 kg (140 lb)   SpO2 95%   BMI 22.60 kg/m²   Pulse ox interpretation: I interpret this pulse ox as normal.  Genl: F sitting in gurney uncomfortably, speaking clearly, appears in mild distress   Head: NC/AT   ENT: Mucous membranes moist, posterior pharynx clear, uvula midline, nares patent bilaterally   Eyes: Right eye has normal sclera, reactive pupils with no inferior defects or pain, no proptosis or impairment of ocular structures.  Left eye has significant amounts of conjunctival irritation and chemosis, there is intact range of motion with no appreciable pain though very clear visible hypopyon is noted in the anterior chamber.  Following administration of proparacaine patient had very limited ability to tolerate a slit-lamp exam, there is an does not appear to be any Casimiro sign on initial fluorescein and scattered uptake is noted in multiple areas.  There is no proptosis, vision is to light dark on the left, 20/20 on the right.  Patient cannot tolerate Jake-Pen exam.    Neck: Supple, FROM, no LAD appreciated  Pulmonary: Lungs are clear to auscultation bilaterally  CV:  RRR, no murmur appreciated, pulses 2+ in both upper and lower extremities  Abdomen: soft, NT/ND; no rebound/guarding  Neuro: A&Ox4 (person, place, time, situation), speech fluent, gait steady, no focal deficits appreciated  Psych: Patient has pressured speech, tangentiality with disorganized thought process.  She is focused on parasites, perseverates frequently, anxious affect and " behavior.  Limited insight and judgment.  No SI or HI   Skin: No rash or lesions.  No pallor or jaundice.  No cyanosis.  Warm and dry.     DIAGNOSTIC STUDIES / PROCEDURES    LABS  Labs Reviewed   CBC WITH DIFFERENTIAL - Abnormal; Notable for the following components:       Result Value    MCHC 32.2 (*)     MPV 8.4 (*)     All other components within normal limits   COMP METABOLIC PANEL   CRP QUANTITIVE (NON-CARDIAC)     RADIOLOGY  CT-MAXILLOFACIAL WITH PLUS RECONS    (Results Pending)     COURSE & MEDICAL DECISION MAKING  Pertinent Labs & Imaging studies reviewed. (See chart for details)    MDM  Initial evaluation at 0300:  Patient seen and evaluated for symptoms as described above.  There is an initial delay in getting orders out because of the planned downtime of the EMR.  Following my initial assessment of the patient she had very clear hypopyon in the left anterior chamber with no proptosis or impairment of ocular movements.  There is no significant pain or pain out of proportion to exam and she is not febrile or tachycardic and does not appear septic.  There appears to be very limited information about CT or lab work obtained at the outside hospital at this time IV access was established, lab work obtained for any possible progressive septic etiology and empiric antibiosis of Unasyn and vancomycin based on presumed endophthalmitis.  There is not appear to be a gross globe rupture, she likely has some polymicrobial origin from introduction of other chemicals or mechanical irritation to the ocular structures.  She has vision loss with likely related to this infection and I emergently contacted ophthalmology.  They agree with the need for admission, empiric antibiosis, they understand the limited exam and the nature of her confounding presentation because of her concurrent delusional parasitosis.  I have administered 5 mg of Haldol to help facilitate further exams and patient is amenable to staying in the hospital  for treatment of this infection.  At this time the patient will be admitted to the [facility] medicine service, she is in guarded condition, plan is for likely operative intervention with ophthalmology later today.  She is n.p.o.    HYDRATION: Based on the patient's presentation of Inability to take oral fluids and Other NPO status the patient was given IV fluids. IV Hydration was used because oral hydration was not adequate alone. Upon recheck following hydration, the patient was improved.    FINAL IMPRESSION  Visit Diagnoses     ICD-10-CM   1. Left eye pain  H57.12   2. Delusional disorder (HCC)  F22   3. Left endophthalmia  H44.002   4. Vision loss of left eye  H54.62     Electronically signed by: Derrick Alvarado M.D., 8/10/2022 3:23 AM

## 2022-08-10 NOTE — PROGRESS NOTES
"Pharmacy Vancomycin Kinetics Note for 8/10/2022     72 y.o. female on Vancomycin day # 1     Vancomycin Indication (AUC Dosing):  (endoopthalmitis)    Provider specified end date: 22    Active Antibiotics (From admission, onward)      Ordered     Ordering Provider       Wed Aug 10, 2022  5:02 AM    08/10/22 0502  MD Alert...Vancomycin per Pharmacy  PHARMACY TO DOSE        Question:  Indication(s) for vancomycin?  Answer:  Other (comments)  Comment:  Endophtalmitis    Tawanda Scherer M.D.       Wed Aug 10, 2022  3:27 AM    08/10/22 0327  vancomycin (VANCOCIN) 1,500 mg in  mL IVPB  (vancomycin (VANCOCIN) IV (LD + Maintenance))  ONCE         Derrick Alvarado M.D.            Dosing Weight: 63.5 kg (139 lb 15.9 oz)      Admission History: Admitted on 8/10/2022 for Endophthalmia, left [H44.002]  Pertinent history: Patient washing eye out with lice shampoo resulting in endophthalmitis.    Allergies:     Augmentin [amoxicillin-pot clavulanate], Flagyl [metronidazole], and Miralax [polyethylene glycol]     Pertinent cultures to date:     Results       ** No results found for the last 336 hours. **            Labs:     Estimated Creatinine Clearance: 70 mL/min (by C-G formula based on SCr of 0.7 mg/dL).  Recent Labs     08/10/22  0304   WBC 7.4   NEUTSPOLYS 61.80     Recent Labs     08/10/22  0304   BUN 12   CREATININE 0.70   ALBUMIN 3.8     No intake or output data in the 24 hours ending 08/10/22 0518   BP (!) 142/70   Pulse 73   Temp 36.4 °C (97.5 °F) (Temporal)   Resp 17   Ht 1.676 m (5' 6\")   Wt 63.5 kg (140 lb)   SpO2 95%  Temp (24hrs), Av.4 °C (97.5 °F), Min:36.4 °C (97.5 °F), Max:36.4 °C (97.5 °F)      List concerns for Vancomycin clearance:     Age    Pharmacokinetics:     AUC kinetics:   Ke (hr ^-1): 0.0625 hr^-1  Half life: 11.09 hr  Clearance: 2.58  Estimated TDD: 1290  Estimated Dose: 704  Estimated interval: 13.1    A/P:     -  Vancomycin dose: 1250 mg IV q24 hr    -  Next vancomycin level(s): "    -TBD    -  Predicted vancomycin AUC from initial AUC test calculator: 484 mg·hr/L    -  Comments: empiric abx for endophthalmitis. Minimal concerns for accumulation, anticipate patient to adequately clear vanco. Peak and trough to be determined once patient closer to steady state if abx are to continue. MRSA nares ordered for de-escalation purposes. Pharmacy to adjust dosing based on clearance concerns, levels and cultures.       Ted Figueroa, SudeepD

## 2022-08-10 NOTE — ASSESSMENT & PLAN NOTE
-history of reported lice shampoo exposure to eye in context of thoughts of parasites in her eyes  -received vancomycin and ceftriaxone in the ED  PLAN  -NPO  -CT orbits showing possible thrombosis of the R sup opthalmic vein, probable retro-orbital infection  -continue vancomycin, defer decision on additional doses of ceftriaxone following OR  -will likely require intravitreal antibiotics, defer to optho  -Evaluated by ophthalmology.  MR venogram revealed no venous sinus thrombosis.  -S/p vitreous biopsy and injection of intravitreal Abx,  left eye on 8/11/2022

## 2022-08-11 ENCOUNTER — ANESTHESIA (OUTPATIENT)
Dept: SURGERY | Facility: MEDICAL CENTER | Age: 72
DRG: 116 | End: 2022-08-11
Payer: MEDICARE

## 2022-08-11 ENCOUNTER — ANESTHESIA EVENT (OUTPATIENT)
Dept: SURGERY | Facility: MEDICAL CENTER | Age: 72
DRG: 116 | End: 2022-08-11
Payer: MEDICARE

## 2022-08-11 LAB
ANION GAP SERPL CALC-SCNC: 12 MMOL/L (ref 7–16)
BUN SERPL-MCNC: 7 MG/DL (ref 8–22)
CALCIUM SERPL-MCNC: 9 MG/DL (ref 8.5–10.5)
CHLORIDE SERPL-SCNC: 103 MMOL/L (ref 96–112)
CO2 SERPL-SCNC: 21 MMOL/L (ref 20–33)
CREAT SERPL-MCNC: 0.54 MG/DL (ref 0.5–1.4)
GFR SERPLBLD CREATININE-BSD FMLA CKD-EPI: 99 ML/MIN/1.73 M 2
GLUCOSE SERPL-MCNC: 86 MG/DL (ref 65–99)
GRAM STN SPEC: NORMAL
POTASSIUM SERPL-SCNC: 3.6 MMOL/L (ref 3.6–5.5)
PROCALCITONIN SERPL-MCNC: <0.05 NG/ML
SIGNIFICANT IND 70042: NORMAL
SITE SITE: NORMAL
SODIUM SERPL-SCNC: 136 MMOL/L (ref 135–145)
SOURCE SOURCE: NORMAL
UFH PPP CHRO-ACNC: 0.61 IU/ML

## 2022-08-11 PROCEDURE — 99231 SBSQ HOSP IP/OBS SF/LOW 25: CPT | Mod: GC | Performed by: PSYCHIATRY & NEUROLOGY

## 2022-08-11 PROCEDURE — A9270 NON-COVERED ITEM OR SERVICE: HCPCS | Performed by: PSYCHIATRY & NEUROLOGY

## 2022-08-11 PROCEDURE — 700111 HCHG RX REV CODE 636 W/ 250 OVERRIDE (IP): Performed by: HOSPITALIST

## 2022-08-11 PROCEDURE — 700102 HCHG RX REV CODE 250 W/ 637 OVERRIDE(OP): Performed by: PSYCHIATRY & NEUROLOGY

## 2022-08-11 PROCEDURE — 700102 HCHG RX REV CODE 250 W/ 637 OVERRIDE(OP): Performed by: HOSPITALIST

## 2022-08-11 PROCEDURE — 80048 BASIC METABOLIC PNL TOTAL CA: CPT

## 2022-08-11 PROCEDURE — 87075 CULTR BACTERIA EXCEPT BLOOD: CPT

## 2022-08-11 PROCEDURE — 700111 HCHG RX REV CODE 636 W/ 250 OVERRIDE (IP): Performed by: OPHTHALMOLOGY

## 2022-08-11 PROCEDURE — 36415 COLL VENOUS BLD VENIPUNCTURE: CPT

## 2022-08-11 PROCEDURE — 160009 HCHG ANES TIME/MIN: Performed by: OPHTHALMOLOGY

## 2022-08-11 PROCEDURE — 3E0C329 INTRODUCTION OF OTHER ANTI-INFECTIVE INTO EYE, PERCUTANEOUS APPROACH: ICD-10-PCS | Performed by: OPHTHALMOLOGY

## 2022-08-11 PROCEDURE — 70544 MR ANGIOGRAPHY HEAD W/O DYE: CPT

## 2022-08-11 PROCEDURE — A9270 NON-COVERED ITEM OR SERVICE: HCPCS | Performed by: HOSPITALIST

## 2022-08-11 PROCEDURE — 84145 PROCALCITONIN (PCT): CPT

## 2022-08-11 PROCEDURE — 700111 HCHG RX REV CODE 636 W/ 250 OVERRIDE (IP): Performed by: ANESTHESIOLOGY

## 2022-08-11 PROCEDURE — 700101 HCHG RX REV CODE 250: Performed by: ANESTHESIOLOGY

## 2022-08-11 PROCEDURE — 160002 HCHG RECOVERY MINUTES (STAT): Performed by: OPHTHALMOLOGY

## 2022-08-11 PROCEDURE — 08B53ZX EXCISION OF LEFT VITREOUS, PERCUTANEOUS APPROACH, DIAGNOSTIC: ICD-10-PCS | Performed by: OPHTHALMOLOGY

## 2022-08-11 PROCEDURE — 160029 HCHG SURGERY MINUTES - 1ST 30 MINS LEVEL 4: Performed by: OPHTHALMOLOGY

## 2022-08-11 PROCEDURE — 99100 ANES PT EXTEME AGE<1 YR&>70: CPT | Performed by: ANESTHESIOLOGY

## 2022-08-11 PROCEDURE — 770001 HCHG ROOM/CARE - MED/SURG/GYN PRIV*

## 2022-08-11 PROCEDURE — 700105 HCHG RX REV CODE 258: Performed by: STUDENT IN AN ORGANIZED HEALTH CARE EDUCATION/TRAINING PROGRAM

## 2022-08-11 PROCEDURE — 160035 HCHG PACU - 1ST 60 MINS PHASE I: Performed by: OPHTHALMOLOGY

## 2022-08-11 PROCEDURE — 85520 HEPARIN ASSAY: CPT

## 2022-08-11 PROCEDURE — 700101 HCHG RX REV CODE 250: Performed by: OPHTHALMOLOGY

## 2022-08-11 PROCEDURE — 99233 SBSQ HOSP IP/OBS HIGH 50: CPT | Performed by: STUDENT IN AN ORGANIZED HEALTH CARE EDUCATION/TRAINING PROGRAM

## 2022-08-11 PROCEDURE — 87205 SMEAR GRAM STAIN: CPT

## 2022-08-11 PROCEDURE — 700111 HCHG RX REV CODE 636 W/ 250 OVERRIDE (IP): Performed by: STUDENT IN AN ORGANIZED HEALTH CARE EDUCATION/TRAINING PROGRAM

## 2022-08-11 PROCEDURE — 87070 CULTURE OTHR SPECIMN AEROBIC: CPT

## 2022-08-11 PROCEDURE — 160041 HCHG SURGERY MINUTES - EA ADDL 1 MIN LEVEL 4: Performed by: OPHTHALMOLOGY

## 2022-08-11 PROCEDURE — 00145 ANES PX EYE VITREORTNL SURG: CPT | Performed by: ANESTHESIOLOGY

## 2022-08-11 PROCEDURE — 160048 HCHG OR STATISTICAL LEVEL 1-5: Performed by: OPHTHALMOLOGY

## 2022-08-11 PROCEDURE — 700105 HCHG RX REV CODE 258: Performed by: ANESTHESIOLOGY

## 2022-08-11 RX ORDER — CEFAZOLIN SODIUM 1 G/3ML
INJECTION, POWDER, FOR SOLUTION INTRAMUSCULAR; INTRAVENOUS
Status: DISCONTINUED | OUTPATIENT
Start: 2022-08-11 | End: 2022-08-11 | Stop reason: HOSPADM

## 2022-08-11 RX ORDER — VANCOMYCIN HYDROCHLORIDE 500 MG/10ML
INJECTION, POWDER, LYOPHILIZED, FOR SOLUTION INTRAVENOUS
Status: COMPLETED | OUTPATIENT
Start: 2022-08-11 | End: 2022-08-11

## 2022-08-11 RX ORDER — SODIUM CHLORIDE, SODIUM LACTATE, POTASSIUM CHLORIDE, CALCIUM CHLORIDE 600; 310; 30; 20 MG/100ML; MG/100ML; MG/100ML; MG/100ML
INJECTION, SOLUTION INTRAVENOUS CONTINUOUS
Status: DISCONTINUED | OUTPATIENT
Start: 2022-08-11 | End: 2022-08-11 | Stop reason: HOSPADM

## 2022-08-11 RX ORDER — HYDROMORPHONE HYDROCHLORIDE 1 MG/ML
0.4 INJECTION, SOLUTION INTRAMUSCULAR; INTRAVENOUS; SUBCUTANEOUS
Status: DISCONTINUED | OUTPATIENT
Start: 2022-08-11 | End: 2022-08-11 | Stop reason: HOSPADM

## 2022-08-11 RX ORDER — BALANCED SALT SOLUTION ENRICHED WITH BICARBONATE, DEXTROSE, AND GLUTATHIONE
KIT INTRAOCULAR
Status: DISCONTINUED | OUTPATIENT
Start: 2022-08-11 | End: 2022-08-11 | Stop reason: HOSPADM

## 2022-08-11 RX ORDER — ONDANSETRON 2 MG/ML
4 INJECTION INTRAMUSCULAR; INTRAVENOUS ONCE
Status: DISCONTINUED | OUTPATIENT
Start: 2022-08-11 | End: 2022-08-11 | Stop reason: HOSPADM

## 2022-08-11 RX ORDER — BALANCED SALT SOLUTION 6.4; .75; .48; .3; 3.9; 1.7 MG/ML; MG/ML; MG/ML; MG/ML; MG/ML; MG/ML
SOLUTION OPHTHALMIC
Status: DISCONTINUED | OUTPATIENT
Start: 2022-08-11 | End: 2022-08-11 | Stop reason: HOSPADM

## 2022-08-11 RX ORDER — ONDANSETRON 2 MG/ML
INJECTION INTRAMUSCULAR; INTRAVENOUS PRN
Status: DISCONTINUED | OUTPATIENT
Start: 2022-08-11 | End: 2022-08-11 | Stop reason: SURG

## 2022-08-11 RX ORDER — DEXAMETHASONE SODIUM PHOSPHATE 4 MG/ML
INJECTION, SOLUTION INTRA-ARTICULAR; INTRALESIONAL; INTRAMUSCULAR; INTRAVENOUS; SOFT TISSUE PRN
Status: DISCONTINUED | OUTPATIENT
Start: 2022-08-11 | End: 2022-08-11 | Stop reason: SURG

## 2022-08-11 RX ORDER — SODIUM CHLORIDE, SODIUM LACTATE, POTASSIUM CHLORIDE, CALCIUM CHLORIDE 600; 310; 30; 20 MG/100ML; MG/100ML; MG/100ML; MG/100ML
INJECTION, SOLUTION INTRAVENOUS
Status: DISCONTINUED | OUTPATIENT
Start: 2022-08-11 | End: 2022-08-11 | Stop reason: SURG

## 2022-08-11 RX ORDER — ATROPINE SULFATE 10 MG/ML
SOLUTION/ DROPS OPHTHALMIC
Status: DISCONTINUED | OUTPATIENT
Start: 2022-08-11 | End: 2022-08-11 | Stop reason: HOSPADM

## 2022-08-11 RX ORDER — OXYCODONE HCL 5 MG/5 ML
5 SOLUTION, ORAL ORAL
Status: DISCONTINUED | OUTPATIENT
Start: 2022-08-11 | End: 2022-08-11 | Stop reason: HOSPADM

## 2022-08-11 RX ORDER — NEOMYCIN SULFATE, POLYMYXIN B SULFATE, AND DEXAMETHASONE 3.5; 10000; 1 MG/G; [USP'U]/G; MG/G
OINTMENT OPHTHALMIC
Status: DISCONTINUED | OUTPATIENT
Start: 2022-08-11 | End: 2022-08-11 | Stop reason: HOSPADM

## 2022-08-11 RX ORDER — DIPHENHYDRAMINE HYDROCHLORIDE 50 MG/ML
12.5 INJECTION INTRAMUSCULAR; INTRAVENOUS
Status: DISCONTINUED | OUTPATIENT
Start: 2022-08-11 | End: 2022-08-11 | Stop reason: HOSPADM

## 2022-08-11 RX ORDER — DEXAMETHASONE SODIUM PHOSPHATE 4 MG/ML
INJECTION, SOLUTION INTRA-ARTICULAR; INTRALESIONAL; INTRAMUSCULAR; INTRAVENOUS; SOFT TISSUE
Status: DISCONTINUED | OUTPATIENT
Start: 2022-08-11 | End: 2022-08-11 | Stop reason: HOSPADM

## 2022-08-11 RX ORDER — MEPERIDINE HYDROCHLORIDE 25 MG/ML
12.5 INJECTION INTRAMUSCULAR; INTRAVENOUS; SUBCUTANEOUS
Status: DISCONTINUED | OUTPATIENT
Start: 2022-08-11 | End: 2022-08-11 | Stop reason: HOSPADM

## 2022-08-11 RX ORDER — HYDROMORPHONE HYDROCHLORIDE 1 MG/ML
0.2 INJECTION, SOLUTION INTRAMUSCULAR; INTRAVENOUS; SUBCUTANEOUS
Status: DISCONTINUED | OUTPATIENT
Start: 2022-08-11 | End: 2022-08-11 | Stop reason: HOSPADM

## 2022-08-11 RX ORDER — HYDROMORPHONE HYDROCHLORIDE 1 MG/ML
0.1 INJECTION, SOLUTION INTRAMUSCULAR; INTRAVENOUS; SUBCUTANEOUS
Status: DISCONTINUED | OUTPATIENT
Start: 2022-08-11 | End: 2022-08-11 | Stop reason: HOSPADM

## 2022-08-11 RX ORDER — HALOPERIDOL 5 MG/ML
INJECTION INTRAMUSCULAR
Status: DISPENSED
Start: 2022-08-10 | End: 2022-08-10

## 2022-08-11 RX ORDER — HALOPERIDOL 5 MG/ML
1 INJECTION INTRAMUSCULAR
Status: DISCONTINUED | OUTPATIENT
Start: 2022-08-11 | End: 2022-08-11 | Stop reason: HOSPADM

## 2022-08-11 RX ORDER — BALANCED SALT SOLUTION ENRICHED WITH BICARBONATE, DEXTROSE, AND GLUTATHIONE
KIT INTRAOCULAR
Status: DISPENSED
Start: 2022-08-11 | End: 2022-08-12

## 2022-08-11 RX ORDER — OXYCODONE HCL 5 MG/5 ML
10 SOLUTION, ORAL ORAL
Status: DISCONTINUED | OUTPATIENT
Start: 2022-08-11 | End: 2022-08-11 | Stop reason: HOSPADM

## 2022-08-11 RX ADMIN — MOXIFLOXACIN HYDROCHLORIDE 1 DROP: 5 SOLUTION/ DROPS OPHTHALMIC at 00:40

## 2022-08-11 RX ADMIN — OXYCODONE 5 MG: 5 TABLET ORAL at 18:47

## 2022-08-11 RX ADMIN — MOXIFLOXACIN HYDROCHLORIDE 1 DROP: 5 SOLUTION/ DROPS OPHTHALMIC at 08:05

## 2022-08-11 RX ADMIN — ONDANSETRON 4 MG: 2 INJECTION INTRAMUSCULAR; INTRAVENOUS at 16:23

## 2022-08-11 RX ADMIN — DEXAMETHASONE SODIUM PHOSPHATE 4 MG: 4 INJECTION, SOLUTION INTRA-ARTICULAR; INTRALESIONAL; INTRAMUSCULAR; INTRAVENOUS; SOFT TISSUE at 16:23

## 2022-08-11 RX ADMIN — MOXIFLOXACIN HYDROCHLORIDE 1 DROP: 5 SOLUTION/ DROPS OPHTHALMIC at 04:00

## 2022-08-11 RX ADMIN — HEPARIN SODIUM 18 UNITS/KG/HR: 5000 INJECTION, SOLUTION INTRAVENOUS at 00:32

## 2022-08-11 RX ADMIN — MOXIFLOXACIN HYDROCHLORIDE 1 DROP: 5 SOLUTION/ DROPS OPHTHALMIC at 10:10

## 2022-08-11 RX ADMIN — ARIPIPRAZOLE 5 MG: 10 TABLET ORAL at 04:53

## 2022-08-11 RX ADMIN — SODIUM CHLORIDE, POTASSIUM CHLORIDE, SODIUM LACTATE AND CALCIUM CHLORIDE: 600; 310; 30; 20 INJECTION, SOLUTION INTRAVENOUS at 16:05

## 2022-08-11 RX ADMIN — FENTANYL CITRATE 50 MCG: 50 INJECTION, SOLUTION INTRAMUSCULAR; INTRAVENOUS at 16:26

## 2022-08-11 RX ADMIN — MOXIFLOXACIN HYDROCHLORIDE 1 DROP: 5 SOLUTION/ DROPS OPHTHALMIC at 12:02

## 2022-08-11 RX ADMIN — PROPOFOL 200 MG: 10 INJECTION, EMULSION INTRAVENOUS at 16:12

## 2022-08-11 RX ADMIN — EPHEDRINE SULFATE 5 MG: 50 INJECTION INTRAMUSCULAR; INTRAVENOUS; SUBCUTANEOUS at 16:14

## 2022-08-11 RX ADMIN — MOXIFLOXACIN HYDROCHLORIDE 1 DROP: 5 SOLUTION/ DROPS OPHTHALMIC at 13:51

## 2022-08-11 RX ADMIN — FENTANYL CITRATE 50 MCG: 50 INJECTION, SOLUTION INTRAMUSCULAR; INTRAVENOUS at 16:13

## 2022-08-11 RX ADMIN — VANCOMYCIN HYDROCHLORIDE 1250 MG: 500 INJECTION, POWDER, LYOPHILIZED, FOR SOLUTION INTRAVENOUS at 08:20

## 2022-08-11 RX ADMIN — ACETAZOLAMIDE 500 MG: 500 INJECTION, POWDER, LYOPHILIZED, FOR SOLUTION INTRAVENOUS at 04:54

## 2022-08-11 RX ADMIN — MOXIFLOXACIN HYDROCHLORIDE 1 DROP: 5 SOLUTION/ DROPS OPHTHALMIC at 02:00

## 2022-08-11 RX ADMIN — MOXIFLOXACIN HYDROCHLORIDE 1 DROP: 5 SOLUTION/ DROPS OPHTHALMIC at 04:55

## 2022-08-11 ASSESSMENT — ENCOUNTER SYMPTOMS
HEMOPTYSIS: 0
TREMORS: 0
PHOTOPHOBIA: 1
DIZZINESS: 0
BLURRED VISION: 1
COUGH: 0
MYALGIAS: 0
VOMITING: 0
PALPITATIONS: 0
FEVER: 0
NAUSEA: 0
BRUISES/BLEEDS EASILY: 0
HEADACHES: 0
BLURRED VISION: 0
CONSTIPATION: 0
DIARRHEA: 0
EYE DISCHARGE: 1
SHORTNESS OF BREATH: 0
BLOOD IN STOOL: 0
EYE PAIN: 1
CHILLS: 0
EYE REDNESS: 1

## 2022-08-11 ASSESSMENT — PAIN DESCRIPTION - PAIN TYPE
TYPE: SURGICAL PAIN
TYPE: SURGICAL PAIN

## 2022-08-11 ASSESSMENT — PAIN SCALES - GENERAL: PAIN_LEVEL: 1

## 2022-08-11 NOTE — PROGRESS NOTES
Hep gtt stopped at 2336 due to MRI and CT scan. Restarted at 0032 and verified with DARIN Piña. Redraw for Anti-xa retimed to 0630

## 2022-08-11 NOTE — PROGRESS NOTES
Ophthalmology Note    CC: Loss of vision left eye     HPI : Fells about the same.               Eye exam:      HM at face OS     External exam.   L/L WNL OD, +1 proptosis OS        C/S White and quite OD,  +3 chemosis with injection OS  Cornea: Clear OD, microcystic edema with several bullae and large central epi defect OS  Iris: flat and round OU  A/C: formed OD large formed hypopyon             A/P:  1. Endophthalmitis, left eye  -unclear etiology  - will proceed with vitrectomy, vitreous biopsy and injection of abx today

## 2022-08-11 NOTE — OR NURSING
1637 Pt to PACU from OR. Report from anesthesia and OR RN. On 6L O2 via mask. Respirations even and unlabored. VSS.     1644 Oral airway removed. Patient's VSS.    1650 Patient tolerating oral intake.    1656 Report called to bedside RNKriss, all questions and concerns answered at this time.    1709 Phase 1 charting complete.    1754 Patient transported back to room via gurney by transporter with family following.

## 2022-08-11 NOTE — CONSULTS
"PSYCHIATRIC INTAKE EVALUATION(new)   Reason for admission: Left endophthalmia, left eye pain  Reason for consult: formication/parasitosis.   Requesting Provider:  Derrick Alvarado M.D.   Legal Hold Status on Admission: Not on a hold   Chart reviewed.         *HPI:          No acute events overnight. No prn use. Pt states she is feeling much better than yesterday and has not had any return of \"feeling like there's bugs under my skin\" she cannnot remember the last time she ddin't feel it. Reports it went from 9/10 to 0/10 today. Reports AH has resolved. Denies side effects. Denies issues with sleeping, using restroom. Currently NPO.     Pt amiable to psychotherapy. She would like to better understand her condition.     Medical ROS (as pertinent):     Review of Systems   Constitutional:  Negative for chills and fever.   HENT:  Negative for congestion and nosebleeds.    Eyes:  Positive for blurred vision, photophobia, pain, discharge and redness.   Respiratory:  Negative for hemoptysis and shortness of breath.    Cardiovascular:  Negative for chest pain and palpitations.   Gastrointestinal:  Negative for blood in stool, constipation, diarrhea, nausea and vomiting.   Genitourinary:  Negative for hematuria.   Skin:  Negative for rash.   Neurological:  Negative for tremors and headaches.   Psychiatric/Behavioral:          See HPI         *Psychiatric Examination:  Vitals: BP (!) 142/70   Pulse 68   Temp 36.2 °C (97.2 °F) (Temporal)   Resp 18   Ht 1.676 m (5' 6\")   Wt 65.6 kg (144 lb 10 oz)   SpO2 95%    General Appearance: appears younger than stated age, no distress  Abnormal Movements: none  Gait and Posture: unable to assess  Speech: normal rate, rhythm, tone  Thought processes: logical, linear, goal oriented to gaining insight to condition  Associations: none  Abnormal or Psychotic Thoughts: AH of music resolved, formication resolved from 9/10 yesterday to 0/10. Denies VH, denies paranoia   Judgement and Insight: " "good/fair  Orientation: alert and oriented x4  Recent and Remote Memory: grosssly intact  Attention Span and Concentration: grossly intact  Language: english  Fund of Knowledge: appropriate for age  Mood and Affect: \"better than yesterday\" euthymic, non-labile, full range  SI/HI: denies si/hi      Tox positive for amphetamines  TSH WNL  RPR negative  A1c: 6.1, elevated  Lipids, WNL  CBC/CMP, WNL  Kidney and liver function WNL      EKG: QTC: 452, 8/10/2022  Brain Imaging: Ordered  CT Orbit:   IMPRESSION:  1.  Relative hyperenhancement of the left superior ophthalmic vein could reflect caroticocavernous fistula or thrombosis of the right superior ophthalmic vein.  2.  Gas throughout multiple vascular structures likely related to aggressive IV access.  3.  6 mm gas collection between the lateral and inferior rectus muscles in the left orbit could represent infection or large collection of gas within a vessel.  4.  Globes are symmetric.  EEG: Not done      Assessment:  Pt has resolved formication and AH of music today after one dose of abilify. Pt appears more alert and bright today, laughs. She is happy to have the feeling resolved and is amiable to starting psychotherapy. Denies SI/HI/AVH today. Denies side effects. Will monitor for akithesia, mood, and symptoms.     Dx:  Delusional parasitosis  Methamphetamine intoxication/withdrawal  R/o substance-induced psychotic disorder  R/o OCD personality disorder  Cannabis use disorder  Poly Substance Use    Medical:  endophthalmia      Plan:  1- Legal hold: on Hold   2- Psychotropic medications   -Abilify 5 mg daily for delusions  3- Please transfer pt to inpatient psychiatric hospital when medically cleared and bed is available  4- Labs ordered/reviewed:  5- EKG ordered/reviewed  6- Old records ordered/reviewed/summarized  7- Discussed the case with: Dr. Spann  8- Psychiatry will follow up.    Thank you for the consult.     Sitter: Yes   Phone: Yes  Visitors: " Yes  Personal belongings: No    This note was created using voice recognition software (Dragon). The accuracy of the dictation is limited by the abilities of the software. I have reviewed the note prior to signing. However, error related to voice recognition software and /or scribes may still exist and should be interpreted within the appropriate context.

## 2022-08-11 NOTE — OP REPORT
Procedure: 25g  Pars Plana Vitrectomy, vitreous biopsy and injection of intravitreal Abx,  left eye     Pre op Diagnosis: Endophthalmitis, left eye     Post op Diagnosis: Endophthalmitis, left eye     Findings: Endophthalmitis, left eye       Immediately prior to procedure, time out was performed to include correct patient, agreement on procedure to be performed, correct side, accurate procedure consent, antibiotics, fluids, safety precautions, and availability of any special implants that might be required.    The patient was taken back into operating area. Subsequently the patient received general anesthesia from the anesthesia department. The eye was then prepped and draped in the usual sterile fashion for ophthalmic surgery, and a lid speculum was placed.     A 25-gauge trocar-cannula system was used to place a cannula in the typical beveled entry with conjunctival displacement in the inferotemporal quadrant. An infusion line was assembled and flushed for all in-line air. The infusion line was placed into the vitreous cavity and was directly visualized prior to unclamping. Once it was unclamped,  a cannulas were placed in the supratemporal quadrant in a similar fashion. Anterior chamber was washed out from large formed hypopyon. Next, the vitreous cutter was introduced into the eye for a biopsy. Very hazy corneal with central corneal defect and diffuse haze was present. Intravitreal Ceftaz and Vanco was injected into the eye. Sclerotomies were watertight. Infusion cannula was removed and site was closed. Subconjunctival injection of dexamethasone and Vanco were given. Lid speculum was removed. Atropine and Maxitrol Ophthalmic ointment was placed in the eye and the eye was patched and overlayed with [place] shield.     The patient tolerated the procedure well without any complications, was taken to the postoperative recovery area in good condition.

## 2022-08-11 NOTE — ANESTHESIA PREPROCEDURE EVALUATION
Case: [id number] Date/Time: 08/11/22 1545    Procedure: VITRECTOMY, POSTERIOR PORTION (Left)    Location: [facility] ROOM 24 / SURGERY SAME DAY [facility]    Surgeons: Meredith Gregory M.D.          Relevant Problems   No relevant active problems       Physical Exam    Airway   Mallampati: II  TM distance: >3 FB  Neck ROM: full       Cardiovascular - normal exam  Rhythm: regular  Rate: normal  (-) murmur     Dental - normal exam           Pulmonary - normal exam  Breath sounds clear to auscultation     Abdominal    Neurological - normal exam                 Anesthesia Plan    ASA 2       Plan - general       Airway plan will be LMA          Induction: intravenous    Postoperative Plan: Postoperative administration of opioids is intended.    Pertinent diagnostic labs and testing reviewed    Informed Consent:    Anesthetic plan and risks discussed with patient.    Use of blood products discussed with: patient whom consented to blood products.

## 2022-08-11 NOTE — ANESTHESIA PROCEDURE NOTES
Airway    Date/Time: 8/11/2022 4:17 PM  Performed by: Miguel Allen M.D.  Authorized by: Miguel Allen M.D.     Location:  OR  Urgency:  Elective  Indications for Airway Management:  Anesthesia      Spontaneous Ventilation: absent    Sedation Level:  Deep  Preoxygenated: Yes    Final Airway Type:  Supraglottic airway  Final Supraglottic Airway:  Standard LMA    SGA Size:  3  Number of Attempts at Approach:  1

## 2022-08-11 NOTE — CARE PLAN
The patient is Stable - Low risk of patient condition declining or worsening    Shift Goals  Clinical Goals: safety  Patient Goals: rest  Family Goals: ronald    Progress made toward(s) clinical / shift goals:  patient denies pain at this time. Able to verbalize need for pain meds    Patient is not progressing towards the following goals:

## 2022-08-11 NOTE — PROGRESS NOTES
[facility] Medicine Daily Progress Note    Date of Service  8/11/2022    Chief Complaint  Mora Guzman is a 72 y.o. female admitted 8/10/2022 with left eye vision loss    Hospital Course  72-year-old female who initially presented to the [facility].  Patient with previously psychiatric diagnoses however history is difficult to obtain as she is not a good historian and further records are unavailable.  Unclear if pt has actually had lice or delusions of parasitosis.  Hx of amphetamine abuse.   presented with complaint of eye pain after using lice shampoo.  In ED found to have endophthalmitis.  Evaluated by ophthalmology.  MR venogram revealed no venous sinus thrombosis.       Interval Problem Update  8/11/2022  Vitals remained stable.  Afebrile  AOx3.  On legal hold.  Psych following denies any discomfort.  Continue complain of vision loss in left eye    Labs unremarkable.  MRI venogram result reviewed  Currently on vancomycin.  On heparin drip.  Can be discontinued as patient does not have ongoing thrombosis.  Will discuss with ophthalmology  Ophthalmology following    I have discussed this patient's plan of care and discharge plan at IDT rounds today with Case Management, Nursing, Nursing leadership, and other members of the IDT team.    Consultants/Specialty  ophthalmology    Code Status  Full Code    Disposition  Patient is not medically cleared for discharge.   Anticipate discharge to to a psychiatric hospital.  I have placed the appropriate orders for post-discharge needs.    Review of Systems  Review of Systems   Constitutional:  Positive for malaise/fatigue. Negative for fever.   HENT:  Negative for hearing loss.    Eyes:  Positive for discharge. Negative for blurred vision.        Complete vision loss in left eye    Respiratory:  Negative for cough and shortness of breath.    Cardiovascular:  Negative for chest pain.   Gastrointestinal:  Negative for nausea and vomiting.   Genitourinary:  Negative  for dysuria.   Musculoskeletal:  Negative for myalgias.   Skin:  Negative for rash.   Neurological:  Negative for dizziness.   Endo/Heme/Allergies:  Does not bruise/bleed easily.      Physical Exam  Temp:  [36.2 °C (97.2 °F)-36.6 °C (97.9 °F)] 36.2 °C (97.2 °F)  Pulse:  [66-71] 68  Resp:  [16-18] 18  BP: (128-146)/(70-87) 142/70  SpO2:  [95 %-96 %] 95 %    Physical Exam  Constitutional:       General: She is not in acute distress.  HENT:      Head: Normocephalic and atraumatic.      Right Ear: Tympanic membrane normal.      Left Ear: Tympanic membrane normal.      Nose: Nose normal.      Mouth/Throat:      Mouth: Mucous membranes are moist.   Eyes:      General:         Left eye: Discharge present.     Extraocular Movements: Extraocular movements intact.      Pupils: Pupils are equal, round, and reactive to light.      Comments: Left eye noted with chemosis , noted hypopyon,noted discharge , complete vision lost        Cardiovascular:      Rate and Rhythm: Normal rate and regular rhythm.      Pulses: Normal pulses.   Pulmonary:      Effort: Pulmonary effort is normal. No respiratory distress.   Abdominal:      General: Abdomen is flat. There is no distension.   Musculoskeletal:      Cervical back: Normal range of motion.      Right lower leg: No edema.      Left lower leg: No edema.   Skin:     General: Skin is warm.   Neurological:      General: No focal deficit present.      Mental Status: She is alert and oriented to person, place, and time. Mental status is at baseline.   Psychiatric:         Mood and Affect: Mood normal.       Fluids  No intake or output data in the 24 hours ending 08/11/22 1017    Laboratory  Recent Labs     08/10/22  0304   WBC 7.4   RBC 4.41   HEMOGLOBIN 13.2   HEMATOCRIT 41.0   MCV 93.0   MCH 29.9   MCHC 32.2*   RDW 47.2   PLATELETCT 391   MPV 8.4*     Recent Labs     08/10/22  0304 08/11/22  0629   SODIUM 141 136   POTASSIUM 3.9 3.6   CHLORIDE 103 103   CO2 28 21   GLUCOSE 81 86   BUN 12  7*   CREATININE 0.70 0.54   CALCIUM 8.9 9.0     Recent Labs     08/10/22  1726   APTT 25.1   INR 1.09         Recent Labs     08/10/22  0304   TRIGLYCERIDE 71   HDL 74   LDL 79       Imaging  MR-VENOGRAM (MRV) HEAD   Final Result      1.  There is no venous sinus thrombosis.The cavernous sinuses cannot be evaluated in the MR venogram. However there is no evidence of asymmetric distention of the cavernous sinuses to suggest any obvious fistula. There is no evidence of large thrombosed    superior ophthalmic vein on the left side.   2.  The T2-weighted sequences demonstrates mild T2 hyperintensity in the left tenon's capsule. There is also increased fluid in the left conjunctival surface. Pre and postcontrast MR examination of the orbit is recommended for further evaluation and to    rule out infection/orbital pseudotumor. The cavernous sinuses and the superior ophthalmic veins can be better evaluated with pre and postcontrast MR examination of the orbit.   3.  There is an approximately 1.4 cm sized extra-axial lesion adjacent to the left lateral parietal lobe likely representing meningioma.      CT-HEAD W/O   Final Result         1.  No acute intracranial abnormality.   2.  Fluid collection anterior to the left globe, likely corresponding with infection as previously documented on CT of the orbits.   3.  Atherosclerosis.         [id number] WITH PLUS RECONS   Final Result      1.  Relative hyperenhancement of the left superior ophthalmic vein could reflect caroticocavernous fistula or thrombosis of the right superior ophthalmic vein.   2.  Gas throughout multiple vascular structures likely related to aggressive IV access.   3.  6 mm gas collection between the lateral and inferior rectus muscles in the left orbit could represent infection or large collection of gas within a vessel.   4.  Globes are symmetric.            Assessment/Plan  * Endophthalmia, left- (present on admission)  Assessment & Plan  -history of  reported lice shampoo exposure to eye in context of thoughts of parasites in her eyes  -received vancomycin and ceftriaxone in the ED  PLAN  -Optho consulted: appreciate their evaluation and recommendations  -NPO  -CT orbits showing possible thrombosis of the R sup opthalmic vein, probable retro-orbital infection  -continue vancomycin, defer decision on additional doses of ceftriaxone following OR  -will likely require intravitreal antibiotics, defer to optho  -MRV brain ordered: I spoke with MRI tech probable will not be done until tomorrow.  Start heparin gtts empirically in the meanwhile    Abrasion of left cornea  Assessment & Plan  Topical Abxs  Opthalmology following    Stimulant abuse (HCC)  Assessment & Plan  UTox + amphetamines  Clinically is having some withdrawal sx's  Cont to manage supportively  Psych following    Thought disorder  Assessment & Plan  Per chart, patient concerned about parasitic infections of abdomen, eyes, body  -labs not suggestive of active parasitic infective process  PLAN  -psychiatric precautions, no sharps when diet eventually initiated         VTE prophylaxis: SCDs/TEDs and therapeutic anticoagulation with heparin    I have performed a physical exam and reviewed and updated ROS and Plan today (8/11/2022). In review of yesterday's note (8/10/2022), there are no changes except as documented above.

## 2022-08-11 NOTE — DIETARY
NUTRITION SERVICES - RD observed wound to right foot second toe. Wound Team consult in place. RD does not suspect need for increased nutrition provisions, though will reassess with staging. RD will monitor per dept policy.

## 2022-08-11 NOTE — ANESTHESIA TIME REPORT
Anesthesia Start and Stop Event Times     Date Time Event    8/11/2022 1542 Ready for Procedure     1605 Anesthesia Start     1638 Anesthesia Stop        Responsible Staff  08/11/22    Name Role Begin End    Miguel Allen M.D. Anesth 1605 1638        Overtime Reason:  no overtime (within assigned shift)    Comments:

## 2022-08-11 NOTE — ANESTHESIA POSTPROCEDURE EVALUATION
Patient: Mora Guzman    Procedure Summary     Date: 08/11/22 Room / Location: [facility] ROOM 24 / SURGERY SAME DAY [facility]    Anesthesia Start: 1605 Anesthesia Stop: 1638    Procedure: VITRECTOMY, POSTERIOR PORTION (Left) Diagnosis: (Endopthalmitis Left Eye)    Surgeons: Meredith Gregory M.D. Responsible Provider: Miguel Allen M.D.    Anesthesia Type: general ASA Status: 2          Final Anesthesia Type: general  Last vitals  BP   Blood Pressure : (!) 145/81    Temp   36.2 °C (97.1 °F)    Pulse   65   Resp   14    SpO2   97 %      Anesthesia Post Evaluation    Patient location during evaluation: PACU  Patient participation: complete - patient participated  Level of consciousness: awake and alert  Pain score: 1    Airway patency: patent  Anesthetic complications: no  Cardiovascular status: adequate and hemodynamically stable  Respiratory status: acceptable  Hydration status: acceptable    PONV: none          No notable events documented.     Nurse Pain Score: 2 (NPRS)

## 2022-08-11 NOTE — CARE PLAN
The patient is Stable - Low risk of patient condition declining or worsening    Shift Goals  Clinical Goals: continue supportive measures, safety  Patient Goals: rest comfort  Family Goals: ALEX    Progress made toward(s) clinical / shift goals:    Problem: Pain - Standard  Goal: Alleviation of pain or a reduction in pain to the patient’s comfort goal  Outcome: Progressing     No c/o pain     Problem: Knowledge Deficit - Standard  Goal: Patient and family/care givers will demonstrate understanding of plan of care, disease process/condition, diagnostic tests and medications  Outcome: Progressing     Pt understands plan of care    Patient is not progressing towards the following goals:

## 2022-08-11 NOTE — WOUND TEAM
[facility]  Inpatient Services  Wound and Skin Care Brief Evaluation    Admission Date: 8/10/2022     Last order of IP CONSULT TO WOUND CARE was found on 8/10/2022 from Hospital Encounter on 8/10/2022     HPI, PMH, SH: Reviewed    Chief Complaint   Patient presents with    Eye Pain     Pt bib ems from [facility] for pt having L eye pain/vision loss for a few weeks now d/t pt washing her eye out with lice shampoo because she feels there are bugs in her eye.      Diagnosis: Endophthalmia, left [H44.002]    Unit where seen by Wound Team: S139/00     Wound consult placed regarding R 2nd toe. Chart and images reviewed. This RN in to assess patient. Pt pleasant and agreeable. Dirt overlying callus to R 2nd toe, no open wound identified. Dirt removed using foam cleanser and washcloth. No pressure injuries or advanced wound care needs identified. Wound consult completed. Wound team signing off, re-consult if patient has any further advanced wound care needs.        RSKIN:   CURRENTLY IN PLACE (X), APPLIED THIS VISIT (A), ORDERED (O):   Q shift Felipe:  X  Q shift pressure point assessments:  X    Surface/Positioning   Pressure redistribution mattress          X  Low Airloss          Bariatric foam      Bariatric JUSTICE     Waffle cushion        Waffle Overlay          Reposition q 2 hours      TAPs Turning system     Z Casper Pillow     Offloading/Redistribution NA  Sacral Mepilex (Silicone dressing)     Heel Mepilex (Silicone dressing)         Heel float boots (Prevalon boot)             Float Heels off Bed with Pillows           Respiratory NA - on RA  Silicone O2 tubing         Gray Foam Ear protectors     Cannula fixation Device (Tender )          High flow offloading Clip    Elastic head band offloading device      Anchorfast                                                         Trach with Optifoam split foam             Containment/Moisture Prevention NA    Rectal tube or BMS    Purwick/Condom  Cath        Dale Catheter    Barrier wipes           Barrier paste       Antifungal tx      Interdry        Mobilization Not assessed this visit      Up to chair        Ambulate      PT/OT      Nutrition Not assessed this visit      Dietician        Diabetes Education      PO     TF     TPN     NPO   # days

## 2022-08-11 NOTE — PROGRESS NOTES
4 Eyes Skin Assessment Completed by DARIN Pierce and donald bennett, DARIN.    Head WDL  Ears WDL  Nose WDL  Mouth WDL  Neck WDL  Breast/Chest WDL  Shoulder Blades WDL  Spine WDL  (R) Arm/Elbow/Hand WDL  (L) Arm/Elbow/Hand WDL  Abdomen WDL  Groin WDL  Scrotum/Coccyx/Buttocks WDL  (R) Leg WDL  (L) Leg WDL  (R) Heel/Foot/Toe Scab  (L) Heel/Foot/Toe WDL            Devices In Places Blood Pressure Cuff      Interventions In Place Pillows    Possible Skin Injury No    Pictures Uploaded Into Epic Yes  Wound Consult Placed N/A  RN Wound Prevention Protocol Ordered Yes

## 2022-08-11 NOTE — PROGRESS NOTES
Unable to start heparin gtt Xa lab results not in. Charge nurse notified, and night shift nurse notified. Aware.

## 2022-08-12 PROBLEM — E87.29 HIGH ANION GAP METABOLIC ACIDOSIS: Status: ACTIVE | Noted: 2022-08-12

## 2022-08-12 LAB
ALBUMIN SERPL BCP-MCNC: 3.5 G/DL (ref 3.2–4.9)
ALBUMIN/GLOB SERPL: 1.3 G/DL
ALP SERPL-CCNC: 108 U/L (ref 30–99)
ALT SERPL-CCNC: 11 U/L (ref 2–50)
ANION GAP SERPL CALC-SCNC: 13 MMOL/L (ref 7–16)
ANION GAP SERPL CALC-SCNC: 18 MMOL/L (ref 7–16)
AST SERPL-CCNC: 14 U/L (ref 12–45)
BASOPHILS # BLD AUTO: 0.1 % (ref 0–1.8)
BASOPHILS # BLD: 0.01 K/UL (ref 0–0.12)
BILIRUB SERPL-MCNC: 0.7 MG/DL (ref 0.1–1.5)
BUN SERPL-MCNC: 11 MG/DL (ref 8–22)
BUN SERPL-MCNC: 32 MG/DL (ref 8–22)
CALCIUM SERPL-MCNC: 8.8 MG/DL (ref 8.5–10.5)
CALCIUM SERPL-MCNC: 9.1 MG/DL (ref 8.5–10.5)
CHLORIDE SERPL-SCNC: 101 MMOL/L (ref 96–112)
CHLORIDE SERPL-SCNC: 102 MMOL/L (ref 96–112)
CO2 SERPL-SCNC: 14 MMOL/L (ref 20–33)
CO2 SERPL-SCNC: 18 MMOL/L (ref 20–33)
CREAT SERPL-MCNC: 0.57 MG/DL (ref 0.5–1.4)
CREAT SERPL-MCNC: 1.38 MG/DL (ref 0.5–1.4)
EOSINOPHIL # BLD AUTO: 0 K/UL (ref 0–0.51)
EOSINOPHIL NFR BLD: 0 % (ref 0–6.9)
ERYTHROCYTE [DISTWIDTH] IN BLOOD BY AUTOMATED COUNT: 44.7 FL (ref 35.9–50)
GFR SERPLBLD CREATININE-BSD FMLA CKD-EPI: 41 ML/MIN/1.73 M 2
GFR SERPLBLD CREATININE-BSD FMLA CKD-EPI: 98 ML/MIN/1.73 M 2
GLOBULIN SER CALC-MCNC: 2.8 G/DL (ref 1.9–3.5)
GLUCOSE SERPL-MCNC: 155 MG/DL (ref 65–99)
GLUCOSE SERPL-MCNC: 97 MG/DL (ref 65–99)
HCT VFR BLD AUTO: 43.7 % (ref 37–47)
HGB BLD-MCNC: 14.3 G/DL (ref 12–16)
IMM GRANULOCYTES # BLD AUTO: 0.04 K/UL (ref 0–0.11)
IMM GRANULOCYTES NFR BLD AUTO: 0.5 % (ref 0–0.9)
LACTATE SERPL-SCNC: 1.1 MMOL/L (ref 0.5–2)
LYMPHOCYTES # BLD AUTO: 0.51 K/UL (ref 1–4.8)
LYMPHOCYTES NFR BLD: 6.6 % (ref 22–41)
MCH RBC QN AUTO: 30.2 PG (ref 27–33)
MCHC RBC AUTO-ENTMCNC: 32.7 G/DL (ref 33.6–35)
MCV RBC AUTO: 92.2 FL (ref 81.4–97.8)
MONOCYTES # BLD AUTO: 0.04 K/UL (ref 0–0.85)
MONOCYTES NFR BLD AUTO: 0.5 % (ref 0–13.4)
NEUTROPHILS # BLD AUTO: 7.09 K/UL (ref 2–7.15)
NEUTROPHILS NFR BLD: 92.3 % (ref 44–72)
NRBC # BLD AUTO: 0 K/UL
NRBC BLD-RTO: 0 /100 WBC
PLATELET # BLD AUTO: 377 K/UL (ref 164–446)
PMV BLD AUTO: 8.5 FL (ref 9–12.9)
POTASSIUM SERPL-SCNC: 3.9 MMOL/L (ref 3.6–5.5)
POTASSIUM SERPL-SCNC: 4 MMOL/L (ref 3.6–5.5)
PROT SERPL-MCNC: 6.3 G/DL (ref 6–8.2)
RBC # BLD AUTO: 4.74 M/UL (ref 4.2–5.4)
SODIUM SERPL-SCNC: 133 MMOL/L (ref 135–145)
SODIUM SERPL-SCNC: 133 MMOL/L (ref 135–145)
WBC # BLD AUTO: 7.7 K/UL (ref 4.8–10.8)

## 2022-08-12 PROCEDURE — 770001 HCHG ROOM/CARE - MED/SURG/GYN PRIV*

## 2022-08-12 PROCEDURE — 83605 ASSAY OF LACTIC ACID: CPT

## 2022-08-12 PROCEDURE — 99232 SBSQ HOSP IP/OBS MODERATE 35: CPT | Performed by: STUDENT IN AN ORGANIZED HEALTH CARE EDUCATION/TRAINING PROGRAM

## 2022-08-12 PROCEDURE — 700111 HCHG RX REV CODE 636 W/ 250 OVERRIDE (IP): Performed by: OPHTHALMOLOGY

## 2022-08-12 PROCEDURE — 700111 HCHG RX REV CODE 636 W/ 250 OVERRIDE (IP): Performed by: STUDENT IN AN ORGANIZED HEALTH CARE EDUCATION/TRAINING PROGRAM

## 2022-08-12 PROCEDURE — 36415 COLL VENOUS BLD VENIPUNCTURE: CPT

## 2022-08-12 PROCEDURE — A9270 NON-COVERED ITEM OR SERVICE: HCPCS | Performed by: PSYCHIATRY & NEUROLOGY

## 2022-08-12 PROCEDURE — 80048 BASIC METABOLIC PNL TOTAL CA: CPT

## 2022-08-12 PROCEDURE — 90791 PSYCH DIAGNOSTIC EVALUATION: CPT | Performed by: SOCIAL WORKER

## 2022-08-12 PROCEDURE — 85025 COMPLETE CBC W/AUTO DIFF WBC: CPT

## 2022-08-12 PROCEDURE — 700102 HCHG RX REV CODE 250 W/ 637 OVERRIDE(OP): Performed by: PSYCHIATRY & NEUROLOGY

## 2022-08-12 PROCEDURE — 80053 COMPREHEN METABOLIC PANEL: CPT

## 2022-08-12 PROCEDURE — 90832 PSYTX W PT 30 MINUTES: CPT | Performed by: SOCIAL WORKER

## 2022-08-12 PROCEDURE — 700101 HCHG RX REV CODE 250: Performed by: OPHTHALMOLOGY

## 2022-08-12 RX ORDER — ERYTHROMYCIN 5 MG/G
OINTMENT OPHTHALMIC
Status: DISCONTINUED | OUTPATIENT
Start: 2022-08-12 | End: 2022-08-15 | Stop reason: HOSPADM

## 2022-08-12 RX ORDER — CIPROFLOXACIN HYDROCHLORIDE 3.5 MG/ML
1 SOLUTION/ DROPS TOPICAL
Status: DISCONTINUED | OUTPATIENT
Start: 2022-08-12 | End: 2022-08-15 | Stop reason: HOSPADM

## 2022-08-12 RX ORDER — TIMOLOL MALEATE 5 MG/ML
1 SOLUTION/ DROPS OPHTHALMIC 2 TIMES DAILY
Status: DISCONTINUED | OUTPATIENT
Start: 2022-08-12 | End: 2022-08-15 | Stop reason: HOSPADM

## 2022-08-12 RX ORDER — ENOXAPARIN SODIUM 100 MG/ML
40 INJECTION SUBCUTANEOUS DAILY
Status: DISCONTINUED | OUTPATIENT
Start: 2022-08-12 | End: 2022-08-15 | Stop reason: HOSPADM

## 2022-08-12 RX ORDER — ATROPINE SULFATE 10 MG/ML
1 SOLUTION/ DROPS OPHTHALMIC DAILY
Status: DISCONTINUED | OUTPATIENT
Start: 2022-08-12 | End: 2022-08-15 | Stop reason: HOSPADM

## 2022-08-12 RX ADMIN — CEFTRIAXONE SODIUM 2 G: 10 INJECTION, POWDER, FOR SOLUTION INTRAVENOUS at 07:33

## 2022-08-12 RX ADMIN — CIPROFLOXACIN 1 DROP: 3 SOLUTION OPHTHALMIC at 21:28

## 2022-08-12 RX ADMIN — ARIPIPRAZOLE 5 MG: 10 TABLET ORAL at 07:33

## 2022-08-12 RX ADMIN — CIPROFLOXACIN 1 DROP: 3 SOLUTION OPHTHALMIC at 17:33

## 2022-08-12 RX ADMIN — ACETAZOLAMIDE 500 MG: 500 INJECTION, POWDER, LYOPHILIZED, FOR SOLUTION INTRAVENOUS at 07:33

## 2022-08-12 RX ADMIN — CIPROFLOXACIN 1 DROP: 3 SOLUTION OPHTHALMIC at 16:16

## 2022-08-12 RX ADMIN — ATROPINE SULFATE 1 DROP: 10 SOLUTION/ DROPS OPHTHALMIC at 16:16

## 2022-08-12 RX ADMIN — ERYTHROMYCIN: 5 OINTMENT OPHTHALMIC at 21:28

## 2022-08-12 RX ADMIN — TIMOLOL MALEATE 1 DROP: 5 SOLUTION OPHTHALMIC at 17:33

## 2022-08-12 RX ADMIN — ENOXAPARIN SODIUM 40 MG: 40 INJECTION SUBCUTANEOUS at 17:33

## 2022-08-12 ASSESSMENT — PAIN DESCRIPTION - PAIN TYPE: TYPE: ACUTE PAIN

## 2022-08-12 NOTE — PROGRESS NOTES
Ophthalmology Note     CC: POD#1 s/p left eye surgery     HPI : Mild pain. No nausea or vomiting. After removed eye patch pt is very happy with improved vision.     Neg Gram satin of vit biopsy         Eye exam:     CF at face OS     External exam.   L/L WNL OS  C/S  +2 injection OS  Cornea: Clear with still large central epi defect.   Iris: flat and round OU  A/C: formed OD        A/P:  1. Endophthalmitis, left eye  -unclear etiology  - s/p Vitrectomy with biopsy and intravitreal Abx injection on 8/11/2022  -doing better  -will start on Timolol BID, Atropine BID. Continue Cipo eye drops 4 times a day and Erytho ointment ant bedtime  -eye shield as needed   -follow up next Thursday or Friday out or inpt

## 2022-08-12 NOTE — PROGRESS NOTES
[facility] Medicine Daily Progress Note    Date of Service  8/12/2022    Chief Complaint  Mora Guzman is a 72 y.o. female admitted 8/10/2022 with left eye vision loss     Hospital Course  72-year-old female who initially presented to the [facility].  Patient with previously psychiatric diagnoses however history is difficult to obtain as she is not a good historian and further records are unavailable.  Unclear if pt has actually had lice or delusions of parasitosis.  Hx of amphetamine abuse.   presented with complaint of eye pain after using lice shampoo.  In ED found to have endophthalmitis.  Evaluated by ophthalmology.  MR venogram revealed no venous sinus thrombosis.S/p vitreous biopsy and injection of intravitreal Abx,  left eye on 8/11/2022        Interval Problem Update  8/11/2022  Vitals remained stable.  Afebrile  AOx3.  On legal hold.  Psych following denies any discomfort.  Continue complain of vision loss in left eye     Labs unremarkable.  MRI venogram result reviewed  Currently on vancomycin.  On heparin drip.  Can be discontinued as patient does not have ongoing thrombosis.  Will discuss with ophthalmology  Ophthalmology following     8/12/2022  Vitals remained stable.  Remained afebrile  Remain asymptomatic.  On legal hold  Labs reviewed.  Noted with low bicarbonate, normal lactic acid  S/p vitreous biopsy and injection of intravitreal Abx,  left eye on 8/11/2022    Culture negative so far  Repeat BMP  Continue Rocephin, intra ocular antibiotics  Ophthalmology following  Psychiatry following      I have discussed this patient's plan of care and discharge plan at IDT rounds today with Case Management, Nursing, Nursing leadership, and other members of the IDT team.     Consultants/Specialty  ophthalmology     Code Status  Full Code     Disposition  Patient is not medically cleared for discharge.   Anticipate discharge to to a psychiatric hospital.  I have placed the appropriate orders for  post-discharge needs.     Review of Systems  Review of Systems   Constitutional:  Positive for malaise/fatigue. Negative for fever.   HENT:  Negative for hearing loss.    Eyes:  Positive for discharge. Negative for blurred vision.        Complete vision loss in left eye    Respiratory:  Negative for cough and shortness of breath.    Cardiovascular:  Negative for chest pain.   Gastrointestinal:  Negative for nausea and vomiting.   Genitourinary:  Negative for dysuria.   Musculoskeletal:  Negative for myalgias.   Skin:  Negative for rash.   Neurological:  Negative for dizziness.   Endo/Heme/Allergies:  Does not bruise/bleed easily.       Physical Exam  Temp:  [36.2 °C (97.2 °F)-36.6 °C (97.9 °F)] 36.2 °C (97.2 °F)  Pulse:  [66-71] 68  Resp:  [16-18] 18  BP: (128-146)/(70-87) 142/70  SpO2:  [95 %-96 %] 95 %     Physical Exam  Constitutional:       General: She is not in acute distress.  HENT:      Head: Normocephalic and atraumatic.      Right Ear: Tympanic membrane normal.      Left Ear: Tympanic membrane normal.      Nose: Nose normal.      Mouth/Throat:      Mouth: Mucous membranes are moist.   Eyes:      General:         Left eye: Discharge present.     Extraocular Movements: Extraocular movements intact.      Pupils: Pupils are equal, round, and reactive to light.      Comments: left eye wrapped with bandage          Cardiovascular:      Rate and Rhythm: Normal rate and regular rhythm.      Pulses: Normal pulses.   Pulmonary:      Effort: Pulmonary effort is normal. No respiratory distress.   Abdominal:      General: Abdomen is flat. There is no distension.   Musculoskeletal:      Cervical back: Normal range of motion.      Right lower leg: No edema.      Left lower leg: No edema.   Skin:     General: Skin is warm.   Neurological:      General: No focal deficit present.      Mental Status: She is alert and oriented to person, place, and time. Mental status is at baseline.   Psychiatric:         Mood and Affect:  Mood normal.        Fluids    Intake/Output Summary (Last 24 hours) at 8/12/2022 1211  Last data filed at 8/12/2022 0951  Gross per 24 hour   Intake 740 ml   Output --   Net 740 ml       Laboratory  Recent Labs     08/10/22  0304 08/12/22  0051   WBC 7.4 7.7   RBC 4.41 4.74   HEMOGLOBIN 13.2 14.3   HEMATOCRIT 41.0 43.7   MCV 93.0 92.2   MCH 29.9 30.2   MCHC 32.2* 32.7*   RDW 47.2 44.7   PLATELETCT 391 377   MPV 8.4* 8.5*     Recent Labs     08/10/22  0304 08/11/22  0629 08/12/22  0051   SODIUM 141 136 133*   POTASSIUM 3.9 3.6 4.0   CHLORIDE 103 103 101   CO2 28 21 14*   GLUCOSE 81 86 97   BUN 12 7* 11   CREATININE 0.70 0.54 0.57   CALCIUM 8.9 9.0 9.1     Recent Labs     08/10/22  1726   APTT 25.1   INR 1.09         Recent Labs     08/10/22  0304   TRIGLYCERIDE 71   HDL 74   LDL 79       Imaging  MR-VENOGRAM (MRV) HEAD   Final Result      1.  There is no venous sinus thrombosis.The cavernous sinuses cannot be evaluated in the MR venogram. However there is no evidence of asymmetric distention of the cavernous sinuses to suggest any obvious fistula. There is no evidence of large thrombosed    superior ophthalmic vein on the left side.   2.  The T2-weighted sequences demonstrates mild T2 hyperintensity in the left tenon's capsule. There is also increased fluid in the left conjunctival surface. Pre and postcontrast MR examination of the orbit is recommended for further evaluation and to    rule out infection/orbital pseudotumor. The cavernous sinuses and the superior ophthalmic veins can be better evaluated with pre and postcontrast MR examination of the orbit.   3.  There is an approximately 1.4 cm sized extra-axial lesion adjacent to the left lateral parietal lobe likely representing meningioma.      CT-HEAD W/O   Final Result         1.  No acute intracranial abnormality.   2.  Fluid collection anterior to the left globe, likely corresponding with infection as previously documented on CT of the orbits.   3.   Atherosclerosis.         [id number] WITH PLUS RECONS   Final Result   Addendum (preliminary) 1 of 1   1.  Conjunctival hyperenhancement on the left is most consistent with    conjunctivitis.   2.  Gas in the right orbit between the lateral and inferior rectus muscles    favors gas within a varix.      Findings discussed with Dr. Gregory of opthalmology and Dr. Smith of    internal medicine.      Final      1.  Relative hyperenhancement of the left superior ophthalmic vein could reflect caroticocavernous fistula or thrombosis of the right superior ophthalmic vein.   2.  Gas throughout multiple vascular structures likely related to aggressive IV access.   3.  6 mm gas collection between the lateral and inferior rectus muscles in the left orbit could represent infection or large collection of gas within a vessel.   4.  Globes are symmetric.           Assessment/Plan  * Endophthalmia, left- (present on admission)  Assessment & Plan  -history of reported lice shampoo exposure to eye in context of thoughts of parasites in her eyes  -received vancomycin and ceftriaxone in the ED  PLAN  -NPO  -CT orbits showing possible thrombosis of the R sup opthalmic vein, probable retro-orbital infection  -continue vancomycin, defer decision on additional doses of ceftriaxone following OR  -will likely require intravitreal antibiotics, defer to optho  -Evaluated by ophthalmology.  MR venogram revealed no venous sinus thrombosis.  -S/p vitreous biopsy and injection of intravitreal Abx,  left eye on 8/11/2022    High anion gap metabolic acidosis  Assessment & Plan    Continue IV fluid - watch for fluid overload   Check lactic acid      Abrasion of left cornea  Assessment & Plan  Topical Abxs  Opthalmology following    Stimulant abuse (HCC)  Assessment & Plan  UTox + amphetamines  Clinically is having some withdrawal sx's  Cont to manage supportively  Psych following    Thought disorder  Assessment & Plan  Per chart, patient  concerned about parasitic infections of abdomen, eyes, body  -labs not suggestive of active parasitic infective process  PLAN  -psychiatric precautions, no sharps when diet eventually initiated  -On legal hold.  Will be transferred to inpatient psych once medically clear       VTE prophylaxis: SCDs/TEDs and enoxaparin ppx    I have performed a physical exam and reviewed and updated ROS and Plan today (8/12/2022). In review of yesterday's note (8/11/2022), there are no changes except as documented above.

## 2022-08-12 NOTE — DISCHARGE PLANNING
Case Management Discharge Planning    Admission Date: 8/10/2022  GMLOS:    ALOS: 2    6-Clicks ADL Score: 24  6-Clicks Mobility Score: 23      Anticipated Discharge Dispo:      DME Needed: No    Action(s) Taken: Updated Provider/Nurse on Discharge Plan  Discussed pt during IDT rounds. Pt is a transfer from [facility]. Admitted for Endophthalmia of the left eye. Has surgery at 2pm. Pt is also on a legal hold. Faxed copy of hold to [facility].   Anticipated dc plan is inpatient psych.     Escalations Completed: None    Medically Clear: No    Next Steps: SW will remain available to assist with dc planning needs.     Barriers to Discharge: Medical clearance and Pending Placement    Is the patient up for discharge tomorrow: No

## 2022-08-12 NOTE — PROGRESS NOTES
Patient requested RN to come into room; per sitter, patient did not want to tell the sitter what she wanted, but it had something to do with her mouth.  This RN entered into Mora's room, where she is calmly sitting in the chair, wearing a sheet that is wrapped around her body like a dress.    When asked Mora what she needed, she says she needs a container to put something in that came out of her mouth, while pointing to a very small white colored speck (tiny piece of food from breakfast?) that she had placed on her bedside table.    This RN offered Mora a toothbrush, asking if she wanted to brush her teeth, but she declined saying she already brushed her teeth. She just wants a small container with a lid to place the tiny white colored speck into, so that she can examine it later with a magnifying glass that she has in her belongings.   Small container with a lid provided to Mora, and an oral care kit also left at her sink in case she wants to brush her teeth again.

## 2022-08-12 NOTE — CONSULTS
"[facility]    INPATIENT ASSESSMENT    Name: Mora Guzman  MRN: [id number]  : 1950  Age: 72 y.o.  Date of assessment: 2022  PCP: Pcp Pt States None  Persons in attendance: Patient    Length of intervention: 30 minutes    CHIEF COMPLAINT/PRESENTING ISSUE (as stated by Patient): Mora Guzman is a 72 year old female referred to [facility] for psychotherapy. Patient was admitted on 8/10/2022 due to the belief that she had parasites on her skin, abdomen and eyes. As a result, patient reportedly used lice shampoo in her eyes resulting in serious eye irritation and vision loss per medical record.  Patient was seen lying on hospital bed, with the hospital bed sheet wrapped around her like a dress, with a knot tie draped around her neck. Patient modeled her hospital sheet dress stating, \"this is an  dress, like they wear in [place]\". Patient continues, \"my grandson is from [place], so this is how they dress there\". Patient continued to model her sheet, turning around and posing. Encouraged patient to please return to the bed, for fear of falling. Patient needed multiple cues to sit on the bed and stop the modeling show.   Patient explained she knows there are bugs and parasites in her body. Patient got up from the bed again and displayed two containers, one clear container and one paper coffee cup container, both with lids. Patient pointed to the clear container and showed a white substance in the bottom of the container. Patient states, \"I need someone to evaluate this in the lab\". \"If they evaluate this and see that it is not a parasite then I stand corrected, but I need this to be looked at seriously\". Patient reports frustration with medical staff whom she states, \"don't listen to me or take me seriously\".  Patient denies active suicidal ideations stating, \"I love myself and the men love me, especially younger men.\" Patient continues, \"I had to divorce my third  " "because he stopped wanting to party, he didn't want to drink or use drugs! He just wanted to sit at home and watch tv and youtube!\" Patient continued, \"I am too fabulous for that!\"    Intervention  At this time patient's delusional thought patterns are all encompassing. Patient however  states, I am willing to be proven wrong if a laboratory test could be conducted\". Patient's personality disorder complicates her delusional thought disorder, as patient is grandiose and  has histrionic tendencies. Patient is open to psychotherapy although she has no clear identification of a problem as she does not perceive she has any problems at this time. Will work with patient on reality testing, but very gently so that patient does not become defensive and shut down emotionally.     Chief Complaint   Patient presents with    Eye Pain     Pt bib ems from [facility] for pt having L eye pain/vision loss for a few weeks now d/t pt washing her eye out with lice shampoo because she feels there are bugs in her eye.         CURRENT LIVING SITUATION/SOCIAL SUPPORT: Patient reports living alone, stating she recently  her third  because he was \"acting too old\"     BEHAVIORAL HEALTH TREATMENT HISTORY  Does patient/parent report a history of prior behavioral health treatment for patient?   No:    SAFETY ASSESSMENT - SELF  Does patient acknowledge current or past symptoms of dangerousness to self? yes  Does parent/significant other report patient has current or past symptoms of dangerousness to self? N\A  Does presenting problem suggest symptoms of dangerousness to self? Yes:     Past Current    Suicidal Thoughts: []  []    Suicidal Plans: []  []    Suicidal Intent: []  []    Suicide Attempts: []  []    Self-Injury [x]  [x]      For any boxes checked above, provide detail: Patient's delusional thoughts causes her to harm herself unintentionally, thinking she is killing the bugs she believes she finds in her " "body.    History of suicide by family member: no  History of suicide by friend/significant other: no  Recent change in frequency/specificity/intensity of suicidal thoughts or self-harm behavior? yes - Patient is not actively suicidal but causes harm due to her delusional thoughts  Current access to firearms, medications, or other identified means of suicide/self-harm? no  If yes, willing to restrict access to means of suicide/self-harm? yes - while in the hospital  Protective factors present:  Optimism    SAFETY ASSESSMENT - OTHERS  Does patient acknowledge current or past symptoms of aggressive behavior or risk to others? no  Does parent/significant other report patient has current or past symptoms of aggressive behavior or risk to others?  no  Does presenting problem suggest symptoms of dangerousness to others? No    Crisis Safety Plan completed and copy given to patient? no    ABUSE/NEGLECT SCREENING  Does patient report feeling “unsafe” in his/her home, or afraid of anyone?  no  Does patient report any history of physical, sexual, or emotional abuse?  no  Does parent or significant other report any of the above? N\A  Is there evidence of neglect by self?  yes  Is there evidence of neglect by a caregiver? no  Does the patient/parent report any history of CPS/APS/police involvement related to suspected abuse/neglect or domestic violence? no  Based on the information provided during the current assessment, is a mandated report of suspected abuse/neglect being made?  No    SUBSTANCE USE SCREENING  Yes:  Cas all substances used in the past 30 days:      Last Use Amount   []   Alcohol     []   Marijuana     []   Heroin     []   Prescription Opioids  (used without prescription, for    recreation, or in excess of prescribed amount)     []   Other Prescription  (used without prescription, for    recreation, or in excess of prescribed amount)     []   Cocaine      [x]   Methamphetamine     []   \"\" drugs (ectasy, " MDMA)     []   Other substances        UDS results: Amphetamine  Breathalyzer results: Not evaluated during this admission    What consequences does the patient associate with any of the above substance use and or addictive behaviors? Relationship problems:     Risk factors for detox (check all that apply):  []  Seizures   []  Diaphoretic (sweating)   []  Tremors   []  Hallucinations   []  Increased blood pressure   []  Decreased blood pressure   []  Other   []  None      [] Patient education on risk factors for detoxification and instructed to return to ER as needed.      MENTAL STATUS              Participation: Verbally monopolizing  Grooming:  Histrionic in appearance  Orientation: Evidence of delusions present  Behavior: Hyperactive  Eye contact: Good  Mood: Manic  Affect: Full range  Thought process: Flight of ideas  Thought content: Obsessions  Speech: Rapid  Perception:  Delusional  Memory:  Recent:  Limited  Insight: Limited  Judgment:  Limited  Other:    Collateral information:   Source:   Significant other present in person:    Significant other by telephone   [facility] Nursing Staff-consulted   [facility] Medical Record-reviewed   Other: Psychiatry     Unable to complete full assessment due to:   Acute intoxication   Patient declined to participate/engage   Patient verbally unresponsive   Significant cognitive deficits   Significant perceptual distortions or behavioral disorganization   Other:             CLINICAL IMPRESSIONS:  Primary:  Delusional parasitosis, Personality Disorder NOS  Secondary:  Methamphetamine use disorder                                       IDENTIFIED NEEDS/PLAN:  [Trigger DISPOSITION list for any items marked]     x Imminent safety risk - self  Imminent safety risk - others     Acute substance withdrawal  x Psychosis/Impaired reality testing    x Mood/anxiety   Substance use/Addictive behavior    x Maladaptive behavior   Parent/child conflict     Family/Couples  conflict   Biomedical     Housing   Financial      Legal  Occupational/Educational     Domestic violence   Other:     Recommendations and Observation Level:  Sitter: Yes  Phone: yes  Visitors: yes  Personal belongings: no      Legal Hold: Extended      Please transfer to an inpatient psychiatric facility when patient is medically cleared.    Thank you,    Tessy Sandoval, Ph.D., [facility]  8/12/2022

## 2022-08-13 LAB
ANION GAP SERPL CALC-SCNC: 9 MMOL/L (ref 7–16)
BUN SERPL-MCNC: 33 MG/DL (ref 8–22)
CALCIUM SERPL-MCNC: 8.3 MG/DL (ref 8.5–10.5)
CHLORIDE SERPL-SCNC: 105 MMOL/L (ref 96–112)
CO2 SERPL-SCNC: 21 MMOL/L (ref 20–33)
CREAT SERPL-MCNC: 1.06 MG/DL (ref 0.5–1.4)
GFR SERPLBLD CREATININE-BSD FMLA CKD-EPI: 56 ML/MIN/1.73 M 2
GLUCOSE SERPL-MCNC: 123 MG/DL (ref 65–99)
POTASSIUM SERPL-SCNC: 3.7 MMOL/L (ref 3.6–5.5)
SODIUM SERPL-SCNC: 135 MMOL/L (ref 135–145)

## 2022-08-13 PROCEDURE — 700101 HCHG RX REV CODE 250: Performed by: STUDENT IN AN ORGANIZED HEALTH CARE EDUCATION/TRAINING PROGRAM

## 2022-08-13 PROCEDURE — A9270 NON-COVERED ITEM OR SERVICE: HCPCS | Performed by: PSYCHIATRY & NEUROLOGY

## 2022-08-13 PROCEDURE — 99232 SBSQ HOSP IP/OBS MODERATE 35: CPT | Performed by: STUDENT IN AN ORGANIZED HEALTH CARE EDUCATION/TRAINING PROGRAM

## 2022-08-13 PROCEDURE — 700102 HCHG RX REV CODE 250 W/ 637 OVERRIDE(OP): Performed by: STUDENT IN AN ORGANIZED HEALTH CARE EDUCATION/TRAINING PROGRAM

## 2022-08-13 PROCEDURE — 700111 HCHG RX REV CODE 636 W/ 250 OVERRIDE (IP): Performed by: STUDENT IN AN ORGANIZED HEALTH CARE EDUCATION/TRAINING PROGRAM

## 2022-08-13 PROCEDURE — 700102 HCHG RX REV CODE 250 W/ 637 OVERRIDE(OP): Performed by: PSYCHIATRY & NEUROLOGY

## 2022-08-13 PROCEDURE — 80048 BASIC METABOLIC PNL TOTAL CA: CPT

## 2022-08-13 PROCEDURE — A9270 NON-COVERED ITEM OR SERVICE: HCPCS | Performed by: STUDENT IN AN ORGANIZED HEALTH CARE EDUCATION/TRAINING PROGRAM

## 2022-08-13 PROCEDURE — 36415 COLL VENOUS BLD VENIPUNCTURE: CPT

## 2022-08-13 PROCEDURE — 770001 HCHG ROOM/CARE - MED/SURG/GYN PRIV*

## 2022-08-13 RX ORDER — CIPROFLOXACIN HYDROCHLORIDE 3.5 MG/ML
1 SOLUTION/ DROPS TOPICAL
Qty: 5 ML | Refills: 0 | Status: SHIPPED | OUTPATIENT
Start: 2022-08-13 | End: 2022-09-12

## 2022-08-13 RX ORDER — ARIPIPRAZOLE 5 MG/1
5 TABLET ORAL DAILY
Qty: 30 TABLET | Refills: 0 | Status: SHIPPED | OUTPATIENT
Start: 2022-08-14 | End: 2022-09-14

## 2022-08-13 RX ORDER — AMOXICILLIN 250 MG
2 CAPSULE ORAL 2 TIMES DAILY
Status: DISCONTINUED | OUTPATIENT
Start: 2022-08-13 | End: 2022-08-15 | Stop reason: HOSPADM

## 2022-08-13 RX ORDER — TIMOLOL MALEATE 5 MG/ML
1 SOLUTION/ DROPS OPHTHALMIC 2 TIMES DAILY
Qty: 10 ML | Refills: 0 | Status: SHIPPED | OUTPATIENT
Start: 2022-08-13 | End: 2022-09-14

## 2022-08-13 RX ORDER — ERYTHROMYCIN 5 MG/G
1 OINTMENT OPHTHALMIC
Qty: 3.5 G | Refills: 0 | Status: SHIPPED | OUTPATIENT
Start: 2022-08-13 | End: 2022-09-12

## 2022-08-13 RX ORDER — ATROPINE SULFATE 10 MG/ML
1 SOLUTION/ DROPS OPHTHALMIC DAILY
Qty: 5 ML | Refills: 0 | Status: SHIPPED | OUTPATIENT
Start: 2022-08-14 | End: 2022-09-14

## 2022-08-13 RX ORDER — BISACODYL 10 MG
10 SUPPOSITORY, RECTAL RECTAL
Status: DISCONTINUED | OUTPATIENT
Start: 2022-08-13 | End: 2022-08-15 | Stop reason: HOSPADM

## 2022-08-13 RX ADMIN — TIMOLOL MALEATE 1 DROP: 5 SOLUTION OPHTHALMIC at 17:14

## 2022-08-13 RX ADMIN — CEFTRIAXONE SODIUM 2 G: 10 INJECTION, POWDER, FOR SOLUTION INTRAVENOUS at 05:00

## 2022-08-13 RX ADMIN — CIPROFLOXACIN 1 DROP: 3 SOLUTION OPHTHALMIC at 21:35

## 2022-08-13 RX ADMIN — CIPROFLOXACIN 1 DROP: 3 SOLUTION OPHTHALMIC at 17:14

## 2022-08-13 RX ADMIN — ERYTHROMYCIN: 5 OINTMENT OPHTHALMIC at 20:38

## 2022-08-13 RX ADMIN — CIPROFLOXACIN 1 DROP: 3 SOLUTION OPHTHALMIC at 14:29

## 2022-08-13 RX ADMIN — ENOXAPARIN SODIUM 40 MG: 40 INJECTION SUBCUTANEOUS at 17:13

## 2022-08-13 RX ADMIN — ATROPINE SULFATE 1 DROP: 10 SOLUTION/ DROPS OPHTHALMIC at 04:59

## 2022-08-13 RX ADMIN — TIMOLOL MALEATE 1 DROP: 5 SOLUTION OPHTHALMIC at 04:59

## 2022-08-13 RX ADMIN — SENNOSIDES AND DOCUSATE SODIUM 2 TABLET: 50; 8.6 TABLET ORAL at 17:13

## 2022-08-13 RX ADMIN — CIPROFLOXACIN 1 DROP: 3 SOLUTION OPHTHALMIC at 09:51

## 2022-08-13 RX ADMIN — ARIPIPRAZOLE 5 MG: 10 TABLET ORAL at 04:59

## 2022-08-13 RX ADMIN — CIPROFLOXACIN 1 DROP: 3 SOLUTION OPHTHALMIC at 04:59

## 2022-08-13 ASSESSMENT — PAIN DESCRIPTION - PAIN TYPE: TYPE: ACUTE PAIN

## 2022-08-13 NOTE — CARE PLAN
The patient is Stable - Low risk of patient condition declining or worsening    Shift Goals  Clinical Goals: Continue 1:1 sitter to prevent eloping; continue supportive cares to fight infection and monitor response.  Patient Goals: To go home.  Family Goals: ALEX    Progress made toward(s) clinical / shift goals:  Mora reports mild, dull pain to left eye, but states that it feels better.  She remains afebrile, and is receiving antibiotic eye drops and IV antibiotic.     Patient is not progressing towards the following goals: Psych team is recommending for Mora to be discharged to inpatient psych facility, and she remains on a legal hold. Awaiting for appropriate discharge facility to be secured, if medical MD feels she is medically cleared.      Problem: Knowledge Deficit - Standard  Goal: Patient and family/care givers will demonstrate understanding of plan of care, disease process/condition, diagnostic tests and medications  Outcome: Not Progressing     Mora wants to go home, and does not understand why she must remain in hospital, stating that she has been medically cleared by the doctors; medical notes from today does not indicate that she is medically cleared yet.    Problem: Psychosocial  Goal: Patient's level of anxiety will decrease  Outcome: Not Progressing   Mora wants to go home, and does not understand why she must remain in hospital, despite being advised that she is on a legal hold from psych team.        Problem: Pain - Standard  Goal: Alleviation of pain or a reduction in pain to the patient’s comfort goal  Outcome: Progressing   Reports dull ache to left eye, declining need for any pain meds.    Problem: Self Care  Goal: Patient will have the ability to perform ADLs independently or with assistance (bathe, groom, dress, toilet and feed)  Outcome: Progressing   Mora is able to perform ADLs independently, but mentation state may play role in poor judgement and decision making  skills.     Problem: Infection - Standard  Goal: Patient will remain free from infection  Outcome: Progressing   Afebrile; WBC 7.7 yesterday; receiving antibiotic eye drops and IV antibiotic.    Problem: Wound/ / Incision Healing  Goal: Patient's wound/surgical incision will decrease in size and heals properly  Outcome: Progressing   Eye sclera still red, and some haziness over iris and pupil.  Mora does keep her eye shield in place.

## 2022-08-13 NOTE — PROGRESS NOTES
[facility] Medicine Daily Progress Note    Date of Service  8/13/2022  Chief Complaint  Mora Guzman is a 72 y.o. female admitted 8/10/2022 with left eye vision loss     Hospital Course  72-year-old female who initially presented to the [facility].  Patient with previously psychiatric diagnoses however history is difficult to obtain as she is not a good historian and further records are unavailable.  Unclear if pt has actually had lice or delusions of parasitosis.  Hx of amphetamine abuse.   presented with complaint of eye pain after using lice shampoo.  In ED found to have endophthalmitis.  Evaluated by ophthalmology.  MR venogram revealed no venous sinus thrombosis.S/p vitreous biopsy and injection of intravitreal Abx,  left eye on 8/11/2022        Interval Problem Update  8/11/2022  Vitals remained stable.  Afebrile  AOx3.  On legal hold.  Psych following denies any discomfort.  Continue complain of vision loss in left eye     Labs unremarkable.  MRI venogram result reviewed  Currently on vancomycin.  On heparin drip.  Can be discontinued as patient does not have ongoing thrombosis.  Will discuss with ophthalmology  Ophthalmology following      8/12/2022  Vitals remained stable.  Remained afebrile  Remain asymptomatic.  On legal hold  Labs reviewed.  Noted with low bicarbonate, normal lactic acid  S/p vitreous biopsy and injection of intravitreal Abx,  left eye on 8/11/2022     Culture negative so far  Repeat BMP  Continue Rocephin, intra ocular antibiotics  Ophthalmology following  Psychiatry following    8/13/2022  Vitals remained stable.  Labs been unremarkable  Wound culture negative so far  Case discussed with ophthalmology recommended continue timolol, atropine, Cipro eyedrops, erythromycin ointment and follow-up with outpatient on Thursday.    Patient admitted cleared for discharge to inpatient psych facility once outpatient follow-up with ophthalmology arranged.     I have discussed this  patient's plan of care and discharge plan at IDT rounds today with Case Management, Nursing, Nursing leadership, and other members of the IDT team.     Consultants/Specialty  ophthalmology     Code Status  Full Code     Disposition  Patient is  medically cleared for discharge.   Anticipate discharge to to a psychiatric hospital.  I have placed the appropriate orders for post-discharge needs.     Review of Systems  Review of Systems   Constitutional:  Positive for malaise/fatigue. Negative for fever.   HENT:  Negative for hearing loss.    Eyes:  Positive for discharge. Negative for blurred vision.        Complete vision loss in left eye    Respiratory:  Negative for cough and shortness of breath.    Cardiovascular:  Negative for chest pain.   Gastrointestinal:  Negative for nausea and vomiting.   Genitourinary:  Negative for dysuria.   Musculoskeletal:  Negative for myalgias.   Skin:  Negative for rash.   Neurological:  Negative for dizziness.   Endo/Heme/Allergies:  Does not bruise/bleed easily.       Physical Exam  Temp:  [36.2 °C (97.2 °F)-36.6 °C (97.9 °F)] 36.2 °C (97.2 °F)  Pulse:  [66-71] 68  Resp:  [16-18] 18  BP: (128-146)/(70-87) 142/70  SpO2:  [95 %-96 %] 95 %     Physical Exam  Constitutional:       General: She is not in acute distress.  HENT:      Head: Normocephalic and atraumatic.      Right Ear: Tympanic membrane normal.      Left Ear: Tympanic membrane normal.      Nose: Nose normal.      Mouth/Throat:      Mouth: Mucous membranes are moist.   Eyes:      General:         Left eye: Discharge present.     Extraocular Movements: Extraocular movements intact.      Pupils: Pupils are equal, round, and reactive to light.      Comments: left eye chemosis much improved, no discharge        Cardiovascular:      Rate and Rhythm: Normal rate and regular rhythm.      Pulses: Normal pulses.   Pulmonary:      Effort: Pulmonary effort is normal. No respiratory distress.   Abdominal:      General: Abdomen is flat.  There is no distension.   Musculoskeletal:      Cervical back: Normal range of motion.      Right lower leg: No edema.      Left lower leg: No edema.   Skin:     General: Skin is warm.   Neurological:      General: No focal deficit present.      Mental Status: She is alert and oriented to person, place, and time. Mental status is at baseline.   Psychiatric:         Mood and Affect: Mood normal.         Fluids    Intake/Output Summary (Last 24 hours) at 8/13/2022 1309  Last data filed at 8/12/2022 1400  Gross per 24 hour   Intake 240 ml   Output --   Net 240 ml       Laboratory  Recent Labs     08/12/22  0051   WBC 7.7   RBC 4.74   HEMOGLOBIN 14.3   HEMATOCRIT 43.7   MCV 92.2   MCH 30.2   MCHC 32.7*   RDW 44.7   PLATELETCT 377   MPV 8.5*     Recent Labs     08/12/22  0051 08/12/22  1611 08/13/22  0100   SODIUM 133* 133* 135   POTASSIUM 4.0 3.9 3.7   CHLORIDE 101 102 105   CO2 14* 18* 21   GLUCOSE 97 155* 123*   BUN 11 32* 33*   CREATININE 0.57 1.38 1.06   CALCIUM 9.1 8.8 8.3*     Recent Labs     08/10/22  1726   APTT 25.1   INR 1.09               Imaging  MR-VENOGRAM (MRV) HEAD   Final Result      1.  There is no venous sinus thrombosis.The cavernous sinuses cannot be evaluated in the MR venogram. However there is no evidence of asymmetric distention of the cavernous sinuses to suggest any obvious fistula. There is no evidence of large thrombosed    superior ophthalmic vein on the left side.   2.  The T2-weighted sequences demonstrates mild T2 hyperintensity in the left tenon's capsule. There is also increased fluid in the left conjunctival surface. Pre and postcontrast MR examination of the orbit is recommended for further evaluation and to    rule out infection/orbital pseudotumor. The cavernous sinuses and the superior ophthalmic veins can be better evaluated with pre and postcontrast MR examination of the orbit.   3.  There is an approximately 1.4 cm sized extra-axial lesion adjacent to the left lateral parietal  lobe likely representing meningioma.      CT-HEAD W/O   Final Result         1.  No acute intracranial abnormality.   2.  Fluid collection anterior to the left globe, likely corresponding with infection as previously documented on CT of the orbits.   3.  Atherosclerosis.         [id number] WITH PLUS RECONS   Final Result   Addendum (preliminary) 1 of 1   1.  Conjunctival hyperenhancement on the left is most consistent with    conjunctivitis.   2.  Gas in the right orbit between the lateral and inferior rectus muscles    favors gas within a varix.      Findings discussed with Dr. Gregory of opthalmology and Dr. Smith of    internal medicine.      Final      1.  Relative hyperenhancement of the left superior ophthalmic vein could reflect caroticocavernous fistula or thrombosis of the right superior ophthalmic vein.   2.  Gas throughout multiple vascular structures likely related to aggressive IV access.   3.  6 mm gas collection between the lateral and inferior rectus muscles in the left orbit could represent infection or large collection of gas within a vessel.   4.  Globes are symmetric.           Assessment/Plan  * Endophthalmia, left- (present on admission)  Assessment & Plan  -history of reported lice shampoo exposure to eye in context of thoughts of parasites in her eyes  -received vancomycin and ceftriaxone in the ED  PLAN  -NPO  -CT orbits showing possible thrombosis of the R sup opthalmic vein, probable retro-orbital infection  -continue vancomycin, defer decision on additional doses of ceftriaxone following OR  -will likely require intravitreal antibiotics, defer to optho  -Evaluated by ophthalmology.  MR venogram revealed no venous sinus thrombosis.  -S/p vitreous biopsy and injection of intravitreal Abx,  left eye on 8/11/2022    High anion gap metabolic acidosis  Assessment & Plan    Continue IV fluid - watch for fluid overload   Check lactic acid      Abrasion of left cornea  Assessment &  Plan  Topical Abxs  Opthalmology following    Stimulant abuse (HCC)  Assessment & Plan  UTox + amphetamines  Clinically is having some withdrawal sx's  Cont to manage supportively  Psych following    Thought disorder  Assessment & Plan  Per chart, patient concerned about parasitic infections of abdomen, eyes, body  -labs not suggestive of active parasitic infective process  PLAN  -psychiatric precautions, no sharps when diet eventually initiated  -On legal hold.  Will be transferred to inpatient psych once medically clear         VTE prophylaxis: SCDs/TEDs and enoxaparin ppx    I have performed a physical exam and reviewed and updated ROS and Plan today (8/13/2022). In review of yesterday's note (8/12/2022), there are no changes except as documented above.

## 2022-08-13 NOTE — DISCHARGE PLANNING
Alert Team Note:    Contacted [facility], spoke to Gloria. Pt packet has been received. Pt has been declined due to being on Rocephin.   Pt is due to be taken off of Rocephin on the 19th. Per Gloria, packet may be re sent at that time.

## 2022-08-13 NOTE — CARE PLAN
The patient is Stable - Low risk of patient condition declining or worsening    Shift Goals  Clinical Goals: supportive measures  Patient Goals: rest  Family Goals: ALEX    Progress made toward(s) clinical / shift goals:    Problem: Pain - Standard  Goal: Alleviation of pain or a reduction in pain to the patient’s comfort goal  Outcome: Progressing  Note: PRN pain meds as needed     Problem: Knowledge Deficit - Standard  Goal: Patient and family/care givers will demonstrate understanding of plan of care, disease process/condition, diagnostic tests and medications  Outcome: Progressing  Note: Pt educated on plan of care       Patient is not progressing towards the following goals:

## 2022-08-13 NOTE — CARE PLAN
The patient is Stable - Low risk of patient condition declining or worsening    Shift Goals  Clinical Goals: Continue supportive measures and monitor response; collaborate with attending teams for care plan.  Patient Goals: To go home to her animals.  Family Goals: ALEX    Progress made toward(s) clinical / shift goals:  Per Mora, eye is feeling better and she believes she can see a little better from left eye today.    Patient is not progressing towards the following goals: Awaiting further psych assessments, and appropriate psych discharge plan/securement.      Problem: Self Care  Goal: Patient will have the ability to perform ADLs independently or with assistance (bathe, groom, dress, toilet and feed)  Outcome: Not Progressing  Mora is able to perform ADLs independently, but mentation state may play role in poor judgement and decision making skills.       Problem: Pain - Standard  Goal: Alleviation of pain or a reduction in pain to the patient’s comfort goal  Outcome: Progressing   Reports dull aching to left eye, but declines need for medication, and states pain is much reduced compared to yesterday.    Problem: Psychosocial  Goal: Patient's level of anxiety will decrease  Outcome: Progressing   Mora is very interested in discharging home to her animals.    Problem: Infection - Standard  Goal: Patient will remain free from infection  Outcome: Progressing   Afebrile; WBC 7.7 this morning; receiving antibiotic eye drops; reports that her eye is feeling better.    Problem: Wound/ / Incision Healing  Goal: Patient's wound/surgical incision will decrease in size and heals properly  Outcome: Progressing

## 2022-08-13 NOTE — DISCHARGE PLANNING
ALERT team  note:   Per [facility], they cannot accept a pt for admission receiving IV antibiotics; this pt is noted to have IV Rocephin ordered until 8/19/22

## 2022-08-14 LAB
BACTERIA WND AEROBE CULT: NORMAL
GRAM STN SPEC: NORMAL
SIGNIFICANT IND 70042: NORMAL
SITE SITE: NORMAL
SOURCE SOURCE: NORMAL

## 2022-08-14 PROCEDURE — 700102 HCHG RX REV CODE 250 W/ 637 OVERRIDE(OP): Performed by: PSYCHIATRY & NEUROLOGY

## 2022-08-14 PROCEDURE — 700111 HCHG RX REV CODE 636 W/ 250 OVERRIDE (IP): Performed by: STUDENT IN AN ORGANIZED HEALTH CARE EDUCATION/TRAINING PROGRAM

## 2022-08-14 PROCEDURE — 700102 HCHG RX REV CODE 250 W/ 637 OVERRIDE(OP): Performed by: STUDENT IN AN ORGANIZED HEALTH CARE EDUCATION/TRAINING PROGRAM

## 2022-08-14 PROCEDURE — A9270 NON-COVERED ITEM OR SERVICE: HCPCS | Performed by: HOSPITALIST

## 2022-08-14 PROCEDURE — A9270 NON-COVERED ITEM OR SERVICE: HCPCS | Performed by: STUDENT IN AN ORGANIZED HEALTH CARE EDUCATION/TRAINING PROGRAM

## 2022-08-14 PROCEDURE — 700102 HCHG RX REV CODE 250 W/ 637 OVERRIDE(OP): Performed by: HOSPITALIST

## 2022-08-14 PROCEDURE — 90837 PSYTX W PT 60 MINUTES: CPT | Performed by: SOCIAL WORKER

## 2022-08-14 PROCEDURE — 770001 HCHG ROOM/CARE - MED/SURG/GYN PRIV*

## 2022-08-14 PROCEDURE — A9270 NON-COVERED ITEM OR SERVICE: HCPCS | Performed by: PSYCHIATRY & NEUROLOGY

## 2022-08-14 PROCEDURE — 99232 SBSQ HOSP IP/OBS MODERATE 35: CPT | Performed by: STUDENT IN AN ORGANIZED HEALTH CARE EDUCATION/TRAINING PROGRAM

## 2022-08-14 PROCEDURE — 700111 HCHG RX REV CODE 636 W/ 250 OVERRIDE (IP): Performed by: HOSPITALIST

## 2022-08-14 RX ADMIN — ERYTHROMYCIN: 5 OINTMENT OPHTHALMIC at 20:22

## 2022-08-14 RX ADMIN — HALOPERIDOL LACTATE 5 MG: 5 INJECTION, SOLUTION INTRAMUSCULAR at 20:26

## 2022-08-14 RX ADMIN — CIPROFLOXACIN 1 DROP: 3 SOLUTION OPHTHALMIC at 13:42

## 2022-08-14 RX ADMIN — ATROPINE SULFATE 1 DROP: 10 SOLUTION/ DROPS OPHTHALMIC at 04:20

## 2022-08-14 RX ADMIN — CIPROFLOXACIN 1 DROP: 3 SOLUTION OPHTHALMIC at 20:22

## 2022-08-14 RX ADMIN — SENNOSIDES AND DOCUSATE SODIUM 2 TABLET: 50; 8.6 TABLET ORAL at 04:20

## 2022-08-14 RX ADMIN — ENOXAPARIN SODIUM 40 MG: 40 INJECTION SUBCUTANEOUS at 18:04

## 2022-08-14 RX ADMIN — TIMOLOL MALEATE 1 DROP: 5 SOLUTION OPHTHALMIC at 18:05

## 2022-08-14 RX ADMIN — CIPROFLOXACIN 1 DROP: 3 SOLUTION OPHTHALMIC at 18:05

## 2022-08-14 RX ADMIN — TIMOLOL MALEATE 1 DROP: 5 SOLUTION OPHTHALMIC at 04:20

## 2022-08-14 RX ADMIN — SENNOSIDES AND DOCUSATE SODIUM 2 TABLET: 50; 8.6 TABLET ORAL at 18:05

## 2022-08-14 RX ADMIN — ARIPIPRAZOLE 5 MG: 10 TABLET ORAL at 04:19

## 2022-08-14 RX ADMIN — OXYCODONE 5 MG: 5 TABLET ORAL at 11:23

## 2022-08-14 RX ADMIN — CEFTRIAXONE SODIUM 2 G: 10 INJECTION, POWDER, FOR SOLUTION INTRAVENOUS at 04:20

## 2022-08-14 RX ADMIN — CIPROFLOXACIN 1 DROP: 3 SOLUTION OPHTHALMIC at 04:21

## 2022-08-14 RX ADMIN — MAGNESIUM HYDROXIDE 30 ML: 400 SUSPENSION ORAL at 13:42

## 2022-08-14 RX ADMIN — CIPROFLOXACIN 1 DROP: 3 SOLUTION OPHTHALMIC at 10:23

## 2022-08-14 NOTE — CARE PLAN
The patient is Stable - Low risk of patient condition declining or worsening    Shift Goals  Clinical Goals: continue supportive measures. 1:1 sitter. safety  Patient Goals: Rest  Family Goals: ALEX    Progress made toward(s) clinical / shift goals:    Problem: Knowledge Deficit - Standard  Goal: Patient and family/care givers will demonstrate understanding of plan of care, disease process/condition, diagnostic tests and medications  Outcome: Progressing     Pt understands plan of care    Problem: Infection - Standard  Goal: Patient will remain free from infection  Outcome: Progressing    Progressing- Pt's redness in eye has decreased with vision improvement    Problem: Wound/ / Incision Healing  Goal: Patient's wound/surgical incision will decrease in size and heals properly  Outcome: Progressing     Progressing- Pt's redness in eye has decreased with vision improvement    Patient is not progressing towards the following goals: n/a

## 2022-08-14 NOTE — CARE PLAN
The patient is Stable - Low risk of patient condition declining or worsening    Shift Goals: psyche plan; eye drops administration; antibiotics; safety  Clinical Goals: continue supportive measures. 1:1 sitter. safety  Patient Goals: Rest  Family Goals: ALEX    Progress made toward(s) clinical / shift goals:  see above    Patient is not progressing towards the following goals:

## 2022-08-14 NOTE — PROGRESS NOTES
[facility] Medicine Daily Progress Note    Date of Service  8/14/2022    Chief Complaint  Mora Guzman is a 72 y.o. female admitted 8/10/2022 with left eye vision loss     Hospital Course  72-year-old female who initially presented to the [facility].  Patient with previously psychiatric diagnoses however history is difficult to obtain as she is not a good historian and further records are unavailable.  Unclear if pt has actually had lice or delusions of parasitosis.  Hx of amphetamine abuse.   presented with complaint of eye pain after using lice shampoo.  In ED found to have endophthalmitis.  Evaluated by ophthalmology.  MR venogram revealed no venous sinus thrombosis.S/p vitreous biopsy and injection of intravitreal Abx,  left eye on 8/11/2022        Interval Problem Update  8/11/2022  Vitals remained stable.  Afebrile  AOx3.  On legal hold.  Psych following denies any discomfort.  Continue complain of vision loss in left eye     Labs unremarkable.  MRI venogram result reviewed  Currently on vancomycin.  On heparin drip.  Can be discontinued as patient does not have ongoing thrombosis.  Will discuss with ophthalmology  Ophthalmology following      8/12/2022  Vitals remained stable.  Remained afebrile  Remain asymptomatic.  On legal hold  Labs reviewed.  Noted with low bicarbonate, normal lactic acid  S/p vitreous biopsy and injection of intravitreal Abx,  left eye on 8/11/2022     Culture negative so far  Repeat BMP  Continue Rocephin, intra ocular antibiotics  Ophthalmology following  Psychiatry following     8/13/2022  Vitals remained stable.  Labs been unremarkable  Wound culture negative so far  Case discussed with ophthalmology recommended continue timolol, atropine, Cipro eyedrops, erythromycin ointment and follow-up with outpatient on Thursday.     Patient admitted cleared for discharge to inpatient psych facility once outpatient follow-up with ophthalmology arranged.   8/14/2022  Vitals remained  stable.  Remained afebrile  Left eye vision slightly improved  Continue eyedrops  Referral was outpatient with ophthalmology   assisting with inpatient psych placement    I have discussed this patient's plan of care and discharge plan at IDT rounds today with Case Management, Nursing, Nursing leadership, and other members of the IDT team.     Consultants/Specialty  ophthalmology     Code Status  Full Code     Disposition  Patient is  medically cleared for discharge.   Anticipate discharge to to a psychiatric hospital.  I have placed the appropriate orders for post-discharge needs.     Review of Systems  Review of Systems   Constitutional:  Positive for malaise/fatigue. Negative for fever.   HENT:  Negative for hearing loss.    Eyes:  Positive for discharge. Negative for blurred vision.        Complete vision loss in left eye    Respiratory:  Negative for cough and shortness of breath.    Cardiovascular:  Negative for chest pain.   Gastrointestinal:  Negative for nausea and vomiting.   Genitourinary:  Negative for dysuria.   Musculoskeletal:  Negative for myalgias.   Skin:  Negative for rash.   Neurological:  Negative for dizziness.   Endo/Heme/Allergies:  Does not bruise/bleed easily.       Physical Exam  Temp:  [36.2 °C (97.2 °F)-36.6 °C (97.9 °F)] 36.2 °C (97.2 °F)  Pulse:  [66-71] 68  Resp:  [16-18] 18  BP: (128-146)/(70-87) 142/70  SpO2:  [95 %-96 %] 95 %     Physical Exam  Constitutional:       General: She is not in acute distress.  HENT:      Head: Normocephalic and atraumatic.      Right Ear: Tympanic membrane normal.      Left Ear: Tympanic membrane normal.      Nose: Nose normal.      Mouth/Throat:      Mouth: Mucous membranes are moist.   Eyes:      General:         Left eye: Discharge present.     Extraocular Movements: Extraocular movements intact.      Pupils: Pupils are equal, round, and reactive to light.      Comments: left eye chemosis much improved, no discharge        Cardiovascular:       Rate and Rhythm: Normal rate and regular rhythm.      Pulses: Normal pulses.   Pulmonary:      Effort: Pulmonary effort is normal. No respiratory distress.   Abdominal:      General: Abdomen is flat. There is no distension.   Musculoskeletal:      Cervical back: Normal range of motion.      Right lower leg: No edema.      Left lower leg: No edema.   Skin:     General: Skin is warm.   Neurological:      General: No focal deficit present.      Mental Status: She is alert and oriented to person, place, and time. Mental status is at baseline.   Psychiatric:         Mood and Affect: Mood normal.   Fluids    Intake/Output Summary (Last 24 hours) at 8/14/2022 1324  Last data filed at 8/14/2022 0900  Gross per 24 hour   Intake 360 ml   Output --   Net 360 ml       Laboratory  Recent Labs     08/12/22  0051   WBC 7.7   RBC 4.74   HEMOGLOBIN 14.3   HEMATOCRIT 43.7   MCV 92.2   MCH 30.2   MCHC 32.7*   RDW 44.7   PLATELETCT 377   MPV 8.5*     Recent Labs     08/12/22  0051 08/12/22  1611 08/13/22  0100   SODIUM 133* 133* 135   POTASSIUM 4.0 3.9 3.7   CHLORIDE 101 102 105   CO2 14* 18* 21   GLUCOSE 97 155* 123*   BUN 11 32* 33*   CREATININE 0.57 1.38 1.06   CALCIUM 9.1 8.8 8.3*                   Imaging  MR-VENOGRAM (MRV) HEAD   Final Result      1.  There is no venous sinus thrombosis.The cavernous sinuses cannot be evaluated in the MR venogram. However there is no evidence of asymmetric distention of the cavernous sinuses to suggest any obvious fistula. There is no evidence of large thrombosed    superior ophthalmic vein on the left side.   2.  The T2-weighted sequences demonstrates mild T2 hyperintensity in the left tenon's capsule. There is also increased fluid in the left conjunctival surface. Pre and postcontrast MR examination of the orbit is recommended for further evaluation and to    rule out infection/orbital pseudotumor. The cavernous sinuses and the superior ophthalmic veins can be better evaluated with pre and  postcontrast MR examination of the orbit.   3.  There is an approximately 1.4 cm sized extra-axial lesion adjacent to the left lateral parietal lobe likely representing meningioma.      CT-HEAD W/O   Final Result         1.  No acute intracranial abnormality.   2.  Fluid collection anterior to the left globe, likely corresponding with infection as previously documented on CT of the orbits.   3.  Atherosclerosis.         [id number] WITH PLUS RECONS   Final Result   Addendum (preliminary) 1 of 1   1.  Conjunctival hyperenhancement on the left is most consistent with    conjunctivitis.   2.  Gas in the right orbit between the lateral and inferior rectus muscles    favors gas within a varix.      Findings discussed with Dr. Gregory of opthalmology and Dr. Smith of    internal medicine.      Final      1.  Relative hyperenhancement of the left superior ophthalmic vein could reflect caroticocavernous fistula or thrombosis of the right superior ophthalmic vein.   2.  Gas throughout multiple vascular structures likely related to aggressive IV access.   3.  6 mm gas collection between the lateral and inferior rectus muscles in the left orbit could represent infection or large collection of gas within a vessel.   4.  Globes are symmetric.           Assessment/Plan  * Endophthalmia, left- (present on admission)  Assessment & Plan  -history of reported lice shampoo exposure to eye in context of thoughts of parasites in her eyes  -received vancomycin and ceftriaxone in the ED  PLAN  -NPO  -CT orbits showing possible thrombosis of the R sup opthalmic vein, probable retro-orbital infection  -continue vancomycin, defer decision on additional doses of ceftriaxone following OR  -will likely require intravitreal antibiotics, defer to optho  -Evaluated by ophthalmology.  MR venogram revealed no venous sinus thrombosis.  -S/p vitreous biopsy and injection of intravitreal Abx,  left eye on 8/11/2022    High anion gap metabolic  acidosis  Assessment & Plan    Continue IV fluid - watch for fluid overload   Check lactic acid      Abrasion of left cornea  Assessment & Plan  Topical Abxs  Opthalmology following    Stimulant abuse (HCC)  Assessment & Plan  UTox + amphetamines  Clinically is having some withdrawal sx's  Cont to manage supportively  Psych following    Thought disorder  Assessment & Plan  Per chart, patient concerned about parasitic infections of abdomen, eyes, body  -labs not suggestive of active parasitic infective process  PLAN  -psychiatric precautions, no sharps when diet eventually initiated  -On legal hold.  Will be transferred to inpatient psych once medically clear         VTE prophylaxis: SCDs/TEDs and enoxaparin ppx    I have performed a physical exam and reviewed and updated ROS and Plan today (8/14/2022). In review of yesterday's note (8/13/2022), there are no changes except as documented above.
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
## 2022-08-15 ENCOUNTER — APPOINTMENT (OUTPATIENT)
Dept: RADIOLOGY | Facility: MEDICAL CENTER | Age: 72
DRG: 116 | End: 2022-08-15
Attending: STUDENT IN AN ORGANIZED HEALTH CARE EDUCATION/TRAINING PROGRAM
Payer: MEDICARE

## 2022-08-15 ENCOUNTER — PHARMACY VISIT (OUTPATIENT)
Dept: PHARMACY | Facility: MEDICAL CENTER | Age: 72
End: 2022-08-15
Payer: COMMERCIAL

## 2022-08-15 VITALS
DIASTOLIC BLOOD PRESSURE: 76 MMHG | HEART RATE: 62 BPM | HEIGHT: 66 IN | RESPIRATION RATE: 18 BRPM | BODY MASS INDEX: 23.24 KG/M2 | WEIGHT: 144.62 LBS | OXYGEN SATURATION: 98 % | SYSTOLIC BLOOD PRESSURE: 140 MMHG | TEMPERATURE: 97.9 F

## 2022-08-15 PROCEDURE — 700102 HCHG RX REV CODE 250 W/ 637 OVERRIDE(OP): Performed by: PSYCHIATRY & NEUROLOGY

## 2022-08-15 PROCEDURE — A9270 NON-COVERED ITEM OR SERVICE: HCPCS | Performed by: HOSPITALIST

## 2022-08-15 PROCEDURE — 700102 HCHG RX REV CODE 250 W/ 637 OVERRIDE(OP): Performed by: HOSPITALIST

## 2022-08-15 PROCEDURE — RXMED WILLOW AMBULATORY MEDICATION CHARGE: Performed by: STUDENT IN AN ORGANIZED HEALTH CARE EDUCATION/TRAINING PROGRAM

## 2022-08-15 PROCEDURE — 73120 X-RAY EXAM OF HAND: CPT | Mod: LT

## 2022-08-15 PROCEDURE — 99239 HOSP IP/OBS DSCHRG MGMT >30: CPT | Performed by: STUDENT IN AN ORGANIZED HEALTH CARE EDUCATION/TRAINING PROGRAM

## 2022-08-15 PROCEDURE — A9270 NON-COVERED ITEM OR SERVICE: HCPCS | Performed by: PSYCHIATRY & NEUROLOGY

## 2022-08-15 RX ADMIN — OXYCODONE 5 MG: 5 TABLET ORAL at 14:00

## 2022-08-15 RX ADMIN — CIPROFLOXACIN 1 DROP: 3 SOLUTION OPHTHALMIC at 13:13

## 2022-08-15 RX ADMIN — CIPROFLOXACIN 1 DROP: 3 SOLUTION OPHTHALMIC at 04:41

## 2022-08-15 RX ADMIN — ATROPINE SULFATE 1 DROP: 10 SOLUTION/ DROPS OPHTHALMIC at 04:41

## 2022-08-15 RX ADMIN — CIPROFLOXACIN 1 DROP: 3 SOLUTION OPHTHALMIC at 09:44

## 2022-08-15 RX ADMIN — ARIPIPRAZOLE 5 MG: 10 TABLET ORAL at 04:41

## 2022-08-15 RX ADMIN — TIMOLOL MALEATE 1 DROP: 5 SOLUTION OPHTHALMIC at 04:41

## 2022-08-15 NOTE — DISCHARGE PLANNING
Alert Team Note:    Contacted [facility], spoke to Carlos. Informed Carlos that pt has been taken off of Rocephin. Updated referral packet has been sent.

## 2022-08-15 NOTE — DISCHARGE PLANNING
Alert Team Note:    Contacted by [facility], spoke to Mila. Pt has been declined due to having pink eye and requiring isolation. Facility has no isolation beds available at this time.

## 2022-08-15 NOTE — PROGRESS NOTES
MD was notified that X-Ray of hand was negative and MD was okay with patient discharging. Report was given to [facility] and the patient's IV was taken out. Discharge education was discussed with patient and the patient was sent with meds and legal hold paperwork. Report and most recent vitals were given to [facility] transportation. [facility] was also given patient medications and belongings.

## 2022-08-15 NOTE — DISCHARGE INSTRUCTIONS
Discharge Instructions per Aayush Smith M.D.  Follow-up with ophthalmology primary care provider within 3 days       Meredith Gregory M.D.  [address] [place] [address] [address] [place] [address]  [phone]     Follow up in 3 day(s)      DIAGNOSIS:  Principal Problem:    Endophthalmia, left POA: Yes  Active Problems:    Thought disorder POA: Unknown    Stimulant abuse (HCC) POA: Unknown    Abrasion of left cornea POA: Unknown    High anion gap metabolic acidosis POA: Unknown  Resolved Problems:    * No resolved hospital problems. *      Return to ER if you develop any of the following symptoms fever, chills, weakness, not feeling well, rash, bleeding, blurred vision, palpitations, cough, pain in any part of your body, shortness of breath, nausea, vomiting, diarrhea, difficulty with urination, dizziness, headache, seizures, loss of consciousness or if you develop any new symptoms.       regular

## 2022-08-15 NOTE — DISCHARGE PLANNING
Case Management Discharge Planning    Admission Date: 8/10/2022  GMLOS:    ALOS: 5    6-Clicks ADL Score: 24  6-Clicks Mobility Score: 23      Anticipated Discharge Dispo:      DME Needed: No    Action(s) Taken: Updated Provider/Nurse on Discharge Plan    Noted plan for dc to [facility] pending transport set up.  Dr Smith alerted and requested cobra form signed-  pending.   Notified by Alert team eye drops have been prescribed and will need to be handed to [facility] transport to go with patient. Communicated with bedside RN April to collect eye drops and send with patient. She verbalizes understanding.     Escalations Completed: None    Medically Clear: Yes    Next Steps: Follow for further dcp needs.     Barriers to Discharge: Pending Placement    Is the patient up for discharge tomorrow: No    1320. Spoke with patient at bedside.  2 patient identifiers used to confirm patient ID.  Discussed dc to [facility] today.  Patient verbalizes understanding.   Patient gave me permission to call her daughter Pearl Coyle [phone]. Called updated to dc plan.

## 2022-08-15 NOTE — DISCHARGE SUMMARY
Discharge Summary    CHIEF COMPLAINT ON ADMISSION  Chief Complaint   Patient presents with    Eye Pain     Pt bib ems from [facility] for pt having L eye pain/vision loss for a few weeks now d/t pt washing her eye out with lice shampoo because she feels there are bugs in her eye.        Reason for Admission  Transfer    Admission Date  8/10/2022     CODE STATUS  Full Code    HPI & HOSPITAL COURSE    72-year-old female who initially presented to the hospital [place].  Patient with previously psychiatric diagnoses however history is difficult to obtain as she is not a good historian and further records are unavailable.  Unclear if pt has actually had lice or delusions of parasitosis.  Hx of amphetamine abuse.   presented with complaint of eye pain after using lice shampoo.  In ED found to have endophthalmitis.  Evaluated by ophthalmology.  MR venogram revealed no venous sinus thrombosis.S/p vitreous biopsy and injection of intravitreal Abx,  left eye on 8/11/2022.Ophthalmology recommended continue timolol, atropine, Cipro eyedrops, erythromycin ointment and follow-up with outpatient on Thursday.    During hospital course patient remain  hemodynamically stable ,asymptomatic ,labs remain unremarkable .  Her left eye vision continue to improve. patient be discharged with close follow-up with ophthalmology.  Patient accepted at [facility].  Patient will be discharged with close follow up with PCP .  Advised for medicine compliance.Discharge plan was discussed with patient in details .  Patient agreed with discharge plan  and  all questions answered.      Therefore, she is discharged in good and stable condition to a psychiatric hospital.    The patient met 2-midnight criteria for an inpatient stay at the time of discharge.        DISCHARGE DIAGNOSES  Principal Problem:    Endophthalmia, left POA: Yes  Active Problems:    Thought disorder POA: Unknown    Stimulant abuse (HCC) POA: Unknown    Abrasion  of left cornea POA: Unknown    High anion gap metabolic acidosis POA: Unknown  Resolved Problems:    * No resolved hospital problems. *      FOLLOW UP    Meredith Gregory M.D.  [address] [place] [address] [address] [place] [address]  [phone]    Follow up in 3 day(s)      MEDICATIONS ON DISCHARGE     Medication List        START taking these medications        Instructions   ARIPiprazole 5 MG tablet  Commonly known as: Abilify   Take 1 Tablet by mouth every day for 30 days.  Dose: 5 mg     atropine 1 % Soln   Administer 1 Drop into the left eye every day for 30 days.  Dose: 1 Drop     ciprofloxacin 0.3 % Soln  Commonly known as: CILOXIN   Administer 1 Drop into the left eye every 4 hours while awake for 30 days.  Dose: 1 Drop     erythromycin 5 MG/GM Oint   Apply 1 Application to left eye at bedtime for 30 days.  Dose: 1 Application     timolol 0.5 % Soln  Commonly known as: TIMOPTIC   Administer 1 Drop into the left eye 2 times a day for 30 days.  Dose: 1 Drop              Allergies  Allergies   Allergen Reactions    Augmentin [Amoxicillin-Pot Clavulanate] Anaphylaxis     Tolerated ceftriaxone on 8/10/22    Flagyl [Metronidazole] Anaphylaxis    Miralax [Polyethylene Glycol] Anaphylaxis       DIET  Orders Placed This Encounter   Procedures    Diet Order Diet: Consistent CHO (Diabetic)     Standing Status:   Standing     Number of Occurrences:   1     Order Specific Question:   Diet:     Answer:   Consistent CHO (Diabetic) [4]       ACTIVITY  As tolerated.  Weight bearing as tolerated    LINES, DRAINS, AND WOUNDS  This is an automated list. Peripheral IVs will be removed prior to discharge.  Peripheral IV 08/12/22 20 G Left Upper arm (Active)   Site Assessment Clean;Dry;Intact 08/15/22 0800   Dressing Type Transparent 08/15/22 0800   Line Status Saline locked 08/15/22 0800   Dressing Status Clean;Dry;Intact 08/15/22 0800   Dressing Intervention N/A 08/15/22 0800   Infiltration Grading ([facility] [place]) 0 08/15/22 0800   Phlebitis Scale  ([facility] Only) 0 08/15/22 0800          Peripheral IV 08/12/22 20 G Left Upper arm (Active)   Site Assessment Clean;Dry;Intact 08/15/22 0800   Dressing Type Transparent 08/15/22 0800   Line Status Saline locked 08/15/22 0800   Dressing Status Clean;Dry;Intact 08/15/22 0800   Dressing Intervention N/A 08/15/22 0800   Infiltration Grading ([facility]) 0 08/15/22 0800   Phlebitis Scale ([facility] Only) 0 08/15/22 0800               MENTAL STATUS ON TRANSFER      A0 x 3        CONSULTATIONS  Opthomology     PROCEDURES  MR-VENOGRAM (MRV) HEAD   Final Result      1.  There is no venous sinus thrombosis.The cavernous sinuses cannot be evaluated in the MR venogram. However there is no evidence of asymmetric distention of the cavernous sinuses to suggest any obvious fistula. There is no evidence of large thrombosed    superior ophthalmic vein on the left side.   2.  The T2-weighted sequences demonstrates mild T2 hyperintensity in the left tenon's capsule. There is also increased fluid in the left conjunctival surface. Pre and postcontrast MR examination of the orbit is recommended for further evaluation and to    rule out infection/orbital pseudotumor. The cavernous sinuses and the superior ophthalmic veins can be better evaluated with pre and postcontrast MR examination of the orbit.   3.  There is an approximately 1.4 cm sized extra-axial lesion adjacent to the left lateral parietal lobe likely representing meningioma.      CT-HEAD W/O   Final Result         1.  No acute intracranial abnormality.   2.  Fluid collection anterior to the left globe, likely corresponding with infection as previously documented on CT of the orbits.   3.  Atherosclerosis.         [id number] WITH PLUS RECONS   Final Result   Addendum (preliminary) 1 of 1   1.  Conjunctival hyperenhancement on the left is most consistent with    conjunctivitis.   2.  Gas in the right orbit between the lateral and inferior rectus muscles    favors gas within a  varix.      Findings discussed with Dr. Gregory of opthalmology and Dr. Smith of    internal medicine.      Final      1.  Relative hyperenhancement of the left superior ophthalmic vein could reflect caroticocavernous fistula or thrombosis of the right superior ophthalmic vein.   2.  Gas throughout multiple vascular structures likely related to aggressive IV access.   3.  6 mm gas collection between the lateral and inferior rectus muscles in the left orbit could represent infection or large collection of gas within a vessel.   4.  Globes are symmetric.            LABORATORY  Lab Results   Component Value Date    SODIUM 135 08/13/2022    POTASSIUM 3.7 08/13/2022    CHLORIDE 105 08/13/2022    CO2 21 08/13/2022    GLUCOSE 123 (H) 08/13/2022    BUN 33 (H) 08/13/2022    CREATININE 1.06 08/13/2022        Lab Results   Component Value Date    WBC 7.7 08/12/2022    HEMOGLOBIN 14.3 08/12/2022    HEMATOCRIT 43.7 08/12/2022    PLATELETCT 377 08/12/2022        Total time of the discharge process exceeds  37  minutes.

## 2022-08-15 NOTE — DISCHARGE PLANNING
Alert Team Note:    Contacted by [facility], spoke to Paul. Pt has been accepted PENDING whether or not the eyedrops can be provided to the facility.   Informed DARIN April.

## 2022-08-15 NOTE — PROGRESS NOTES
Spoke with MD about plan of care and that that the patient was already accepted at the behavorial health facility but that patient was complaining of left hand pain. MD came by to see patient and said that depending on X-Ray, that patient was still okay to discharge.

## 2022-08-15 NOTE — DISCHARGE PLANNING
Alert Team Note:    Contacted by [facility], spoke to Clint. Pt packet has been received and reviewed. Per Clint, the facility does not carry any of the 4 types of eye drops this pt is taking. Facility is requesting a 10 day supply of each be sent with the pt if accepted.   Writer has informed DARIN April.    Sent insurance verification as requested.

## 2022-08-15 NOTE — DISCHARGE PLANNING
Alert Team Note:     Contacted by [facility], spoke to Mila. Pt packet has been received and is currently being reviewed. No bed availability at this time.

## 2022-08-15 NOTE — DISCHARGE PLANNING
Alert Team Note:    Per RN April, the doctor has written a prescription for this pt's eye drops. Writer has informed ALVINO Tubbs that these prescriptions must be given to YONAS during pickup.    Contacted [facility], spoke to Paul. Pt has been accepted. Accepting is Dr. Chemea. Transfer time TBD.  Informed  PAULO Palma.

## 2022-08-15 NOTE — CARE PLAN
The patient is Stable - Low risk of patient condition declining or worsening    Shift Goals: pain control, antibiotics, eye drops  Clinical Goals: ATB therapy and continue supportive measures  Patient Goals: Rest, comfort, discharge  Family Goals: ALEX    Progress made toward(s) clinical / shift goals:  see above    Patient is not progressing towards the following goals:

## 2022-08-15 NOTE — DISCHARGE PLANNING
Alert Team Note:    Contacted by DARIN Hidalgo. Per April, YONAS had arrived at 1555 but did not have the legal paperwork or transfer packet. Writer went down and handed completed legal hold and petition to [facility].   Informed  Director Siri Brady that per DARIN Hidalgo, no COBRA was completed for this pt. Per Siri, the COBRA will be completed and scanned into the pt's chart.   Writer has sent the transfer packet to [facility] via fax.  Informed DARIN Hidalgo, [facility], and Alert  Letty.     Sent progress note from DARIN Hidalgo at 1635 to [facility] as well.

## 2022-08-15 NOTE — DISCHARGE PLANNING
Legal Hold Transfer     Referral: Legal hold transfer to [facility]     Intervention: Notified by  Migdalia that pt has been accepted to [facility].     Pt's accepting physcian is Dr. Cheema    Spoke to Liane at [facility]     Transport arranged through at [facility]     The pt will be picked up at 1600      Notified Bedside DARIN Hidalgo and DRAIN Tubbs of the departure time as well as accepting facility.      Transfer packet and COBRA to be created with original legal hold by RN CM and placed on chart.      Plan: Pt will be transferring to [facility] today at 1600 via [facility].

## 2022-08-15 NOTE — CARE PLAN
The patient is Stable - Low risk of patient condition declining or worsening    Shift Goals  Clinical Goals: ATB therapy and continue supportive measures  Patient Goals: Rest, comfort  Family Goals: ALEX    Progress made toward(s) clinical / shift goals:    Problem: Pain - Standard  Goal: Alleviation of pain or a reduction in pain to the patient’s comfort goal  Outcome: Progressing    No c/o pain this shift     Problem: Knowledge Deficit - Standard  Goal: Patient and family/care givers will demonstrate understanding of plan of care, disease process/condition, diagnostic tests and medications  Outcome: Progressing     Pt teaching on plan of care. Pt understands need of ATB therapy both through IV and eye drops/ointment    Problem: Psychosocial  Goal: Patient's level of anxiety will decrease  Outcome: Progressing    Pt anxious, but redirectable. Therapeutic communication used to help redirect patient and ease anxiety.      Patient is not progressing towards the following goals:

## 2022-08-21 LAB
BACTERIA SPEC ANAEROBE CULT: NORMAL
SIGNIFICANT IND 70042: NORMAL
SITE SITE: NORMAL
SOURCE SOURCE: NORMAL
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
## 2023-05-08 ENCOUNTER — HOSPITAL ENCOUNTER (INPATIENT)
Facility: MEDICAL CENTER | Age: 73
LOS: 3 days | DRG: 872 | End: 2023-05-11
Attending: STUDENT IN AN ORGANIZED HEALTH CARE EDUCATION/TRAINING PROGRAM | Admitting: STUDENT IN AN ORGANIZED HEALTH CARE EDUCATION/TRAINING PROGRAM
Payer: MEDICARE

## 2023-05-08 ENCOUNTER — APPOINTMENT (OUTPATIENT)
Dept: RADIOLOGY | Facility: MEDICAL CENTER | Age: 73
DRG: 872 | End: 2023-05-08
Attending: STUDENT IN AN ORGANIZED HEALTH CARE EDUCATION/TRAINING PROGRAM
Payer: MEDICARE

## 2023-05-08 DIAGNOSIS — B95.0 BACTERIAL INFECTION DUE TO STREPTOCOCCUS, GROUP A: ICD-10-CM

## 2023-05-08 DIAGNOSIS — L03.115 CELLULITIS OF RIGHT LOWER EXTREMITY: ICD-10-CM

## 2023-05-08 PROBLEM — Z78.9 ALCOHOL USE: Status: ACTIVE | Noted: 2023-05-08

## 2023-05-08 PROBLEM — A41.9 SEPSIS (HCC): Status: ACTIVE | Noted: 2023-05-08

## 2023-05-08 LAB
ALBUMIN SERPL BCP-MCNC: 2.6 G/DL (ref 3.2–4.9)
ALBUMIN/GLOB SERPL: 0.9 G/DL
ALP SERPL-CCNC: 87 U/L (ref 30–99)
ALT SERPL-CCNC: 22 U/L (ref 2–50)
ANION GAP SERPL CALC-SCNC: 9 MMOL/L (ref 7–16)
ANISOCYTOSIS BLD QL SMEAR: ABNORMAL
AST SERPL-CCNC: 26 U/L (ref 12–45)
BASOPHILS # BLD AUTO: 0 % (ref 0–1.8)
BASOPHILS # BLD: 0 K/UL (ref 0–0.12)
BILIRUB SERPL-MCNC: 0.6 MG/DL (ref 0.1–1.5)
BUN SERPL-MCNC: 13 MG/DL (ref 8–22)
CALCIUM ALBUM COR SERPL-MCNC: 8.7 MG/DL (ref 8.5–10.5)
CALCIUM SERPL-MCNC: 7.6 MG/DL (ref 8.4–10.2)
CHLORIDE SERPL-SCNC: 101 MMOL/L (ref 96–112)
CO2 SERPL-SCNC: 21 MMOL/L (ref 20–33)
CREAT SERPL-MCNC: 0.66 MG/DL (ref 0.5–1.4)
EOSINOPHIL # BLD AUTO: 0 K/UL (ref 0–0.51)
EOSINOPHIL NFR BLD: 0 % (ref 0–6.9)
ERYTHROCYTE [DISTWIDTH] IN BLOOD BY AUTOMATED COUNT: 48.4 FL (ref 35.9–50)
GFR SERPLBLD CREATININE-BSD FMLA CKD-EPI: 94 ML/MIN/1.73 M 2
GLOBULIN SER CALC-MCNC: 2.8 G/DL (ref 1.9–3.5)
GLUCOSE SERPL-MCNC: 92 MG/DL (ref 65–99)
HCT VFR BLD AUTO: 36.1 % (ref 37–47)
HGB BLD-MCNC: 11.4 G/DL (ref 12–16)
LACTATE SERPL-SCNC: 1.6 MMOL/L (ref 0.5–2)
LYMPHOCYTES # BLD AUTO: 1.28 K/UL (ref 1–4.8)
LYMPHOCYTES NFR BLD: 6 % (ref 22–41)
MACROCYTES BLD QL SMEAR: ABNORMAL
MAGNESIUM SERPL-MCNC: 1.5 MG/DL (ref 1.5–2.5)
MANUAL DIFF BLD: NORMAL
MCH RBC QN AUTO: 29 PG (ref 27–33)
MCHC RBC AUTO-ENTMCNC: 31.6 G/DL (ref 33.6–35)
MCV RBC AUTO: 91.9 FL (ref 81.4–97.8)
MONOCYTES # BLD AUTO: 0.21 K/UL (ref 0–0.85)
MONOCYTES NFR BLD AUTO: 1 % (ref 0–13.4)
NEUTROPHILS # BLD AUTO: 19.9 K/UL (ref 2–7.15)
NEUTROPHILS NFR BLD: 93 % (ref 44–72)
NRBC # BLD AUTO: 0 K/UL
NRBC BLD-RTO: 0 /100 WBC
NT-PROBNP SERPL IA-MCNC: 2636 PG/ML (ref 0–125)
PHOSPHATE SERPL-MCNC: 2.4 MG/DL (ref 2.5–4.5)
PLATELET # BLD AUTO: 265 K/UL (ref 164–446)
PLATELET BLD QL SMEAR: NORMAL
PMV BLD AUTO: 9 FL (ref 9–12.9)
POTASSIUM SERPL-SCNC: 3.6 MMOL/L (ref 3.6–5.5)
PROCALCITONIN SERPL-MCNC: 1.88 NG/ML
PROT SERPL-MCNC: 5.4 G/DL (ref 6–8.2)
RBC # BLD AUTO: 3.93 M/UL (ref 4.2–5.4)
RBC BLD AUTO: PRESENT
SODIUM SERPL-SCNC: 131 MMOL/L (ref 135–145)
WBC # BLD AUTO: 21.4 K/UL (ref 4.8–10.8)

## 2023-05-08 PROCEDURE — 99292 CRITICAL CARE ADDL 30 MIN: CPT | Performed by: STUDENT IN AN ORGANIZED HEALTH CARE EDUCATION/TRAINING PROGRAM

## 2023-05-08 PROCEDURE — 700101 HCHG RX REV CODE 250: Performed by: STUDENT IN AN ORGANIZED HEALTH CARE EDUCATION/TRAINING PROGRAM

## 2023-05-08 PROCEDURE — 700111 HCHG RX REV CODE 636 W/ 250 OVERRIDE (IP): Performed by: STUDENT IN AN ORGANIZED HEALTH CARE EDUCATION/TRAINING PROGRAM

## 2023-05-08 PROCEDURE — 83880 ASSAY OF NATRIURETIC PEPTIDE: CPT

## 2023-05-08 PROCEDURE — 770022 HCHG ROOM/CARE - ICU (200)

## 2023-05-08 PROCEDURE — 80053 COMPREHEN METABOLIC PANEL: CPT

## 2023-05-08 PROCEDURE — 99291 CRITICAL CARE FIRST HOUR: CPT | Performed by: STUDENT IN AN ORGANIZED HEALTH CARE EDUCATION/TRAINING PROGRAM

## 2023-05-08 PROCEDURE — 94760 N-INVAS EAR/PLS OXIMETRY 1: CPT

## 2023-05-08 PROCEDURE — 85007 BL SMEAR W/DIFF WBC COUNT: CPT

## 2023-05-08 PROCEDURE — 87040 BLOOD CULTURE FOR BACTERIA: CPT

## 2023-05-08 PROCEDURE — 36415 COLL VENOUS BLD VENIPUNCTURE: CPT

## 2023-05-08 PROCEDURE — 700105 HCHG RX REV CODE 258: Performed by: STUDENT IN AN ORGANIZED HEALTH CARE EDUCATION/TRAINING PROGRAM

## 2023-05-08 PROCEDURE — A9270 NON-COVERED ITEM OR SERVICE: HCPCS | Performed by: HOSPITALIST

## 2023-05-08 PROCEDURE — 83735 ASSAY OF MAGNESIUM: CPT

## 2023-05-08 PROCEDURE — 770020 HCHG ROOM/CARE - TELE (206)

## 2023-05-08 PROCEDURE — 83605 ASSAY OF LACTIC ACID: CPT

## 2023-05-08 PROCEDURE — 84100 ASSAY OF PHOSPHORUS: CPT

## 2023-05-08 PROCEDURE — 700102 HCHG RX REV CODE 250 W/ 637 OVERRIDE(OP): Performed by: HOSPITALIST

## 2023-05-08 PROCEDURE — 71045 X-RAY EXAM CHEST 1 VIEW: CPT

## 2023-05-08 PROCEDURE — 85025 COMPLETE CBC W/AUTO DIFF WBC: CPT

## 2023-05-08 PROCEDURE — 84145 PROCALCITONIN (PCT): CPT

## 2023-05-08 RX ORDER — MAGNESIUM SULFATE HEPTAHYDRATE 40 MG/ML
2 INJECTION, SOLUTION INTRAVENOUS ONCE
Status: COMPLETED | OUTPATIENT
Start: 2023-05-08 | End: 2023-05-08

## 2023-05-08 RX ORDER — LORAZEPAM 2 MG/ML
2 INJECTION INTRAMUSCULAR
Status: DISCONTINUED | OUTPATIENT
Start: 2023-05-08 | End: 2023-05-11 | Stop reason: HOSPADM

## 2023-05-08 RX ORDER — LORAZEPAM 2 MG/ML
0.5 INJECTION INTRAMUSCULAR EVERY 4 HOURS PRN
Status: DISCONTINUED | OUTPATIENT
Start: 2023-05-08 | End: 2023-05-11 | Stop reason: HOSPADM

## 2023-05-08 RX ORDER — SODIUM CHLORIDE, SODIUM LACTATE, POTASSIUM CHLORIDE, CALCIUM CHLORIDE 600; 310; 30; 20 MG/100ML; MG/100ML; MG/100ML; MG/100ML
INJECTION, SOLUTION INTRAVENOUS CONTINUOUS
Status: DISCONTINUED | OUTPATIENT
Start: 2023-05-08 | End: 2023-05-08

## 2023-05-08 RX ORDER — FOLIC ACID 1 MG/1
1 TABLET ORAL DAILY
Status: DISCONTINUED | OUTPATIENT
Start: 2023-05-09 | End: 2023-05-11 | Stop reason: HOSPADM

## 2023-05-08 RX ORDER — AMOXICILLIN 250 MG
2 CAPSULE ORAL 2 TIMES DAILY
Status: DISCONTINUED | OUTPATIENT
Start: 2023-05-08 | End: 2023-05-11 | Stop reason: HOSPADM

## 2023-05-08 RX ORDER — LORAZEPAM 0.5 MG/1
0.5 TABLET ORAL EVERY 4 HOURS PRN
Status: DISCONTINUED | OUTPATIENT
Start: 2023-05-08 | End: 2023-05-11 | Stop reason: HOSPADM

## 2023-05-08 RX ORDER — ENOXAPARIN SODIUM 100 MG/ML
40 INJECTION SUBCUTANEOUS DAILY
Status: DISCONTINUED | OUTPATIENT
Start: 2023-05-08 | End: 2023-05-11 | Stop reason: HOSPADM

## 2023-05-08 RX ORDER — LORAZEPAM 2 MG/ML
1 INJECTION INTRAMUSCULAR
Status: DISCONTINUED | OUTPATIENT
Start: 2023-05-08 | End: 2023-05-11 | Stop reason: HOSPADM

## 2023-05-08 RX ORDER — LORAZEPAM 1 MG/1
4 TABLET ORAL
Status: DISCONTINUED | OUTPATIENT
Start: 2023-05-08 | End: 2023-05-11 | Stop reason: HOSPADM

## 2023-05-08 RX ORDER — LORAZEPAM 1 MG/1
3 TABLET ORAL
Status: DISCONTINUED | OUTPATIENT
Start: 2023-05-08 | End: 2023-05-11 | Stop reason: HOSPADM

## 2023-05-08 RX ORDER — LORAZEPAM 1 MG/1
1 TABLET ORAL EVERY 4 HOURS PRN
Status: DISCONTINUED | OUTPATIENT
Start: 2023-05-08 | End: 2023-05-11 | Stop reason: HOSPADM

## 2023-05-08 RX ORDER — GAUZE BANDAGE 2" X 2"
100 BANDAGE TOPICAL DAILY
Status: DISCONTINUED | OUTPATIENT
Start: 2023-05-09 | End: 2023-05-11 | Stop reason: HOSPADM

## 2023-05-08 RX ORDER — LORAZEPAM 1 MG/1
2 TABLET ORAL
Status: DISCONTINUED | OUTPATIENT
Start: 2023-05-08 | End: 2023-05-11 | Stop reason: HOSPADM

## 2023-05-08 RX ORDER — LORAZEPAM 2 MG/ML
1.5 INJECTION INTRAMUSCULAR
Status: DISCONTINUED | OUTPATIENT
Start: 2023-05-08 | End: 2023-05-11 | Stop reason: HOSPADM

## 2023-05-08 RX ORDER — MAGNESIUM SULFATE 1 G/100ML
1 INJECTION INTRAVENOUS ONCE
Status: COMPLETED | OUTPATIENT
Start: 2023-05-08 | End: 2023-05-08

## 2023-05-08 RX ORDER — CLINDAMYCIN PHOSPHATE 900 MG/50ML
900 INJECTION, SOLUTION INTRAVENOUS EVERY 8 HOURS
Status: DISCONTINUED | OUTPATIENT
Start: 2023-05-08 | End: 2023-05-10

## 2023-05-08 RX ORDER — BISACODYL 10 MG
10 SUPPOSITORY, RECTAL RECTAL
Status: DISCONTINUED | OUTPATIENT
Start: 2023-05-08 | End: 2023-05-11 | Stop reason: HOSPADM

## 2023-05-08 RX ORDER — SODIUM CHLORIDE, SODIUM LACTATE, POTASSIUM CHLORIDE, CALCIUM CHLORIDE 600; 310; 30; 20 MG/100ML; MG/100ML; MG/100ML; MG/100ML
INJECTION, SOLUTION INTRAVENOUS CONTINUOUS
Status: DISCONTINUED | OUTPATIENT
Start: 2023-05-08 | End: 2023-05-09

## 2023-05-08 RX ADMIN — POTASSIUM PHOSPHATE, MONOBASIC AND POTASSIUM PHOSPHATE, DIBASIC 30 MMOL: 224; 236 INJECTION, SOLUTION, CONCENTRATE INTRAVENOUS at 17:29

## 2023-05-08 RX ADMIN — CLINDAMYCIN PHOSPHATE 900 MG: 900 INJECTION, SOLUTION INTRAVENOUS at 16:14

## 2023-05-08 RX ADMIN — SODIUM CHLORIDE, POTASSIUM CHLORIDE, SODIUM LACTATE AND CALCIUM CHLORIDE: 600; 310; 30; 20 INJECTION, SOLUTION INTRAVENOUS at 15:52

## 2023-05-08 RX ADMIN — CLINDAMYCIN PHOSPHATE 900 MG: 900 INJECTION, SOLUTION INTRAVENOUS at 22:18

## 2023-05-08 RX ADMIN — CEFTRIAXONE SODIUM 2000 MG: 2 INJECTION, POWDER, FOR SOLUTION INTRAMUSCULAR; INTRAVENOUS at 16:13

## 2023-05-08 RX ADMIN — SODIUM CHLORIDE, POTASSIUM CHLORIDE, SODIUM LACTATE AND CALCIUM CHLORIDE: 600; 310; 30; 20 INJECTION, SOLUTION INTRAVENOUS at 15:43

## 2023-05-08 RX ADMIN — MAGNESIUM SULFATE 2 G: 2 INJECTION INTRAVENOUS at 17:22

## 2023-05-08 RX ADMIN — MAGNESIUM SULFATE IN DEXTROSE 1 G: 10 INJECTION, SOLUTION INTRAVENOUS at 19:02

## 2023-05-08 RX ADMIN — LORAZEPAM 1 MG: 1 TABLET ORAL at 22:25

## 2023-05-08 RX ADMIN — SODIUM CHLORIDE, POTASSIUM CHLORIDE, SODIUM LACTATE AND CALCIUM CHLORIDE: 600; 310; 30; 20 INJECTION, SOLUTION INTRAVENOUS at 18:45

## 2023-05-08 RX ADMIN — ENOXAPARIN SODIUM 40 MG: 40 INJECTION SUBCUTANEOUS at 17:27

## 2023-05-08 ASSESSMENT — COGNITIVE AND FUNCTIONAL STATUS - GENERAL
STANDING UP FROM CHAIR USING ARMS: A LITTLE
MOVING FROM LYING ON BACK TO SITTING ON SIDE OF FLAT BED: A LITTLE
SUGGESTED CMS G CODE MODIFIER DAILY ACTIVITY: CJ
CLIMB 3 TO 5 STEPS WITH RAILING: A LITTLE
MOBILITY SCORE: 19
WALKING IN HOSPITAL ROOM: A LITTLE
HELP NEEDED FOR BATHING: A LITTLE
MOVING TO AND FROM BED TO CHAIR: A LITTLE
SUGGESTED CMS G CODE MODIFIER MOBILITY: CK
TOILETING: A LITTLE
DAILY ACTIVITIY SCORE: 22

## 2023-05-08 ASSESSMENT — LIFESTYLE VARIABLES
ORIENTATION AND CLOUDING OF SENSORIUM: ORIENTED AND CAN DO SERIAL ADDITIONS
HEADACHE, FULLNESS IN HEAD: NOT PRESENT
HOW MANY TIMES IN THE PAST YEAR HAVE YOU HAD 5 OR MORE DRINKS IN A DAY: 12
HAVE YOU EVER FELT YOU SHOULD CUT DOWN ON YOUR DRINKING: NO
PAROXYSMAL SWEATS: NO SWEAT VISIBLE
ANXIETY: NO ANXIETY (AT EASE)
TOTAL SCORE: 8
CONSUMPTION TOTAL: POSITIVE
ANXIETY: *
EVER FELT BAD OR GUILTY ABOUT YOUR DRINKING: NO
TOTAL SCORE: VERY MILD ITCHING, PINS AND NEEDLES SENSATION, BURNING OR NUMBNESS
ALCOHOL_USE: YES
TOTAL SCORE: 0
VISUAL DISTURBANCES: NOT PRESENT
AUDITORY DISTURBANCES: NOT PRESENT
VISUAL DISTURBANCES: NOT PRESENT
AGITATION: SOMEWHAT MORE THAN NORMAL ACTIVITY
TOTAL SCORE: 0
AGITATION: NORMAL ACTIVITY
NAUSEA AND VOMITING: NO NAUSEA AND NO VOMITING
TREMOR: NO TREMOR
HEADACHE, FULLNESS IN HEAD: MILD
TOTAL SCORE: 0
NAUSEA AND VOMITING: NO NAUSEA AND NO VOMITING
ON A TYPICAL DAY WHEN YOU DRINK ALCOHOL HOW MANY DRINKS DO YOU HAVE: 4
AUDITORY DISTURBANCES: NOT PRESENT
ORIENTATION AND CLOUDING OF SENSORIUM: ORIENTED AND CAN DO SERIAL ADDITIONS
TREMOR: *
PAROXYSMAL SWEATS: NO SWEAT VISIBLE
TOTAL SCORE: 0
AVERAGE NUMBER OF DAYS PER WEEK YOU HAVE A DRINK CONTAINING ALCOHOL: 7
HAVE PEOPLE ANNOYED YOU BY CRITICIZING YOUR DRINKING: NO
EVER HAD A DRINK FIRST THING IN THE MORNING TO STEADY YOUR NERVES TO GET RID OF A HANGOVER: NO

## 2023-05-08 ASSESSMENT — PATIENT HEALTH QUESTIONNAIRE - PHQ9
2. FEELING DOWN, DEPRESSED, IRRITABLE, OR HOPELESS: NOT AT ALL
SUM OF ALL RESPONSES TO PHQ9 QUESTIONS 1 AND 2: 0
1. LITTLE INTEREST OR PLEASURE IN DOING THINGS: NOT AT ALL
2. FEELING DOWN, DEPRESSED, IRRITABLE, OR HOPELESS: NOT AT ALL
1. LITTLE INTEREST OR PLEASURE IN DOING THINGS: NOT AT ALL
SUM OF ALL RESPONSES TO PHQ9 QUESTIONS 1 AND 2: 0

## 2023-05-08 ASSESSMENT — PAIN DESCRIPTION - PAIN TYPE
TYPE: ACUTE PAIN

## 2023-05-08 ASSESSMENT — ENCOUNTER SYMPTOMS
NAUSEA: 0
VOMITING: 0
FOCAL WEAKNESS: 0
SHORTNESS OF BREATH: 0

## 2023-05-08 ASSESSMENT — FIBROSIS 4 INDEX: FIB4 SCORE: 0.79

## 2023-05-08 NOTE — PROGRESS NOTES
12-hour chart check complete.    Monitor Summary  Rhythm: SR  Rate: 75  Ectopy: rPVC and PAC  Measurements: .18/.08/.36

## 2023-05-08 NOTE — CARE PLAN
The patient is Watcher - Medium risk of patient condition declining or worsening         Progress made toward(s) clinical / shift goals:      Patient is not progressing towards the following goals:      Problem: Knowledge Deficit - Standard  Goal: Patient and family/care givers will demonstrate understanding of plan of care, disease process/condition, diagnostic tests and medications  Outcome: Progressing     Problem: Fall Risk  Goal: Patient will remain free from falls  Outcome: Progressing     Problem: Pain - Standard  Goal: Alleviation of pain or a reduction in pain to the patient’s comfort goal  Outcome: Progressing

## 2023-05-08 NOTE — PROGRESS NOTES
4 Eyes Skin Assessment Completed by DARIN Castro and DARIN Avery.    Head WDL  Ears WDL  Nose WDL  Mouth WDL  Neck WDL  Breast/Chest WDL  Shoulder Blades WDL  Spine WDL  (R) Arm/Elbow/Hand WDL  (L) Arm/Elbow/Hand WDL  Abdomen WDL  Groin WDL  Scrotum/Coccyx/Buttocks Redness bruising   (R) Leg Redness, Swelling, and Edema  (L) Leg WDL  (R) Heel/Foot/Toe WDL  (L) Heel/Foot/Toe WDL          Devices In Places ECG, Blood Pressure Cuff, Pulse Ox, and SCD's      Interventions In Place Gray Ear Foams    Possible Skin Injury No    Pictures Uploaded Into Epic N/A  Wound Consult Placed N/A  RN Wound Prevention Protocol Ordered No

## 2023-05-08 NOTE — ASSESSMENT & PLAN NOTE
Likely source is RLE cellulitis.    - rec starting ceftriaxone and continuing clindamycin (tolerated CTX in 8/2022 per chart review)  - IVFs and prn vasopressors as noted  - repeat blood cultures  - CXR
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
## 2023-05-08 NOTE — ASSESSMENT & PLAN NOTE
This is Severe Sepsis Not present on admission  SIRS criteria identified on my evaluation include: Leukocytosis, with WBC greater than 12,000  Clinical indicators of end organ dysfunction include Systolic blood pressure (SBP) <90 mmHg or mean arterial pressure <65 mmHg  Source is Bacteremia  Sepsis protocol initiated  Crystalloid Fluid Administration: Fluid resuscitation ordered per standard protocol - 30 mL/kg per current or ideal body weight  IV antibiotics as appropriate for source of sepsis  Reassessment: I have reassessed the patient's hemodynamic status    Patient now progressing towards septic shock from her Grp A strep bacteremia - potentially 2/2 TSS vs inadequate abx coverage w/clindamycin monotherapy.     - rec starting ceftriaxone and continuing clindamycin (tolerated CTX in 8/2022 per chart review)  - ok to continue maintenance IVFs as we start PO diet and see how much she tolerates - BNP is elevated but doesn't look overloaded on exam  - f/u TTE  - repeat blood cultures  - CXR

## 2023-05-09 ENCOUNTER — APPOINTMENT (OUTPATIENT)
Dept: CARDIOLOGY | Facility: MEDICAL CENTER | Age: 73
DRG: 872 | End: 2023-05-09
Attending: STUDENT IN AN ORGANIZED HEALTH CARE EDUCATION/TRAINING PROGRAM
Payer: MEDICARE

## 2023-05-09 PROBLEM — L03.115 CELLULITIS OF RIGHT LOWER EXTREMITY: Status: ACTIVE | Noted: 2023-05-09

## 2023-05-09 PROBLEM — E87.1 HYPONATREMIA: Status: ACTIVE | Noted: 2023-05-09

## 2023-05-09 PROBLEM — D64.89 OTHER SPECIFIED ANEMIAS: Status: ACTIVE | Noted: 2023-05-09

## 2023-05-09 LAB
ANION GAP SERPL CALC-SCNC: 9 MMOL/L (ref 7–16)
BASOPHILS # BLD AUTO: 0.2 % (ref 0–1.8)
BASOPHILS # BLD: 0.03 K/UL (ref 0–0.12)
BUN SERPL-MCNC: 12 MG/DL (ref 8–22)
CALCIUM SERPL-MCNC: 7.9 MG/DL (ref 8.4–10.2)
CHLORIDE SERPL-SCNC: 97 MMOL/L (ref 96–112)
CO2 SERPL-SCNC: 20 MMOL/L (ref 20–33)
CREAT SERPL-MCNC: 0.51 MG/DL (ref 0.5–1.4)
EOSINOPHIL # BLD AUTO: 0 K/UL (ref 0–0.51)
EOSINOPHIL NFR BLD: 0 % (ref 0–6.9)
ERYTHROCYTE [DISTWIDTH] IN BLOOD BY AUTOMATED COUNT: 45.7 FL (ref 35.9–50)
GFR SERPLBLD CREATININE-BSD FMLA CKD-EPI: 100 ML/MIN/1.73 M 2
GLUCOSE SERPL-MCNC: 117 MG/DL (ref 65–99)
HCT VFR BLD AUTO: 34.5 % (ref 37–47)
HGB BLD-MCNC: 11.4 G/DL (ref 12–16)
IMM GRANULOCYTES # BLD AUTO: 0.11 K/UL (ref 0–0.11)
IMM GRANULOCYTES NFR BLD AUTO: 0.6 % (ref 0–0.9)
LV EJECT FRACT  99904: 55
LV EJECT FRACT MOD 2C 99903: 51.49
LV EJECT FRACT MOD 4C 99902: 57.54
LV EJECT FRACT MOD BP 99901: 55.43
LYMPHOCYTES # BLD AUTO: 1.1 K/UL (ref 1–4.8)
LYMPHOCYTES NFR BLD: 5.9 % (ref 22–41)
MAGNESIUM SERPL-MCNC: 1.8 MG/DL (ref 1.5–2.5)
MCH RBC QN AUTO: 29.4 PG (ref 27–33)
MCHC RBC AUTO-ENTMCNC: 33 G/DL (ref 33.6–35)
MCV RBC AUTO: 88.9 FL (ref 81.4–97.8)
MONOCYTES # BLD AUTO: 0.65 K/UL (ref 0–0.85)
MONOCYTES NFR BLD AUTO: 3.5 % (ref 0–13.4)
NEUTROPHILS # BLD AUTO: 16.91 K/UL (ref 2–7.15)
NEUTROPHILS NFR BLD: 89.8 % (ref 44–72)
NRBC # BLD AUTO: 0 K/UL
NRBC BLD-RTO: 0 /100 WBC
PHOSPHATE SERPL-MCNC: 2.9 MG/DL (ref 2.5–4.5)
PLATELET # BLD AUTO: 235 K/UL (ref 164–446)
PMV BLD AUTO: 9 FL (ref 9–12.9)
POTASSIUM SERPL-SCNC: 4.8 MMOL/L (ref 3.6–5.5)
RBC # BLD AUTO: 3.88 M/UL (ref 4.2–5.4)
SODIUM SERPL-SCNC: 126 MMOL/L (ref 135–145)
WBC # BLD AUTO: 18.8 K/UL (ref 4.8–10.8)

## 2023-05-09 PROCEDURE — 94760 N-INVAS EAR/PLS OXIMETRY 1: CPT

## 2023-05-09 PROCEDURE — 700105 HCHG RX REV CODE 258: Performed by: HOSPITALIST

## 2023-05-09 PROCEDURE — A9270 NON-COVERED ITEM OR SERVICE: HCPCS | Performed by: STUDENT IN AN ORGANIZED HEALTH CARE EDUCATION/TRAINING PROGRAM

## 2023-05-09 PROCEDURE — 700105 HCHG RX REV CODE 258: Performed by: STUDENT IN AN ORGANIZED HEALTH CARE EDUCATION/TRAINING PROGRAM

## 2023-05-09 PROCEDURE — 80048 BASIC METABOLIC PNL TOTAL CA: CPT

## 2023-05-09 PROCEDURE — 770020 HCHG ROOM/CARE - TELE (206)

## 2023-05-09 PROCEDURE — 93306 TTE W/DOPPLER COMPLETE: CPT | Mod: 26 | Performed by: INTERNAL MEDICINE

## 2023-05-09 PROCEDURE — 84100 ASSAY OF PHOSPHORUS: CPT

## 2023-05-09 PROCEDURE — 85025 COMPLETE CBC W/AUTO DIFF WBC: CPT

## 2023-05-09 PROCEDURE — 99233 SBSQ HOSP IP/OBS HIGH 50: CPT | Performed by: INTERNAL MEDICINE

## 2023-05-09 PROCEDURE — 700101 HCHG RX REV CODE 250: Performed by: STUDENT IN AN ORGANIZED HEALTH CARE EDUCATION/TRAINING PROGRAM

## 2023-05-09 PROCEDURE — 93306 TTE W/DOPPLER COMPLETE: CPT

## 2023-05-09 PROCEDURE — 700102 HCHG RX REV CODE 250 W/ 637 OVERRIDE(OP): Performed by: STUDENT IN AN ORGANIZED HEALTH CARE EDUCATION/TRAINING PROGRAM

## 2023-05-09 PROCEDURE — 83735 ASSAY OF MAGNESIUM: CPT

## 2023-05-09 PROCEDURE — 700111 HCHG RX REV CODE 636 W/ 250 OVERRIDE (IP): Performed by: STUDENT IN AN ORGANIZED HEALTH CARE EDUCATION/TRAINING PROGRAM

## 2023-05-09 RX ORDER — CALCIUM GLUCONATE 20 MG/ML
1 INJECTION, SOLUTION INTRAVENOUS ONCE
Status: COMPLETED | OUTPATIENT
Start: 2023-05-09 | End: 2023-05-09

## 2023-05-09 RX ORDER — MAGNESIUM SULFATE HEPTAHYDRATE 40 MG/ML
2 INJECTION, SOLUTION INTRAVENOUS ONCE
Status: COMPLETED | OUTPATIENT
Start: 2023-05-09 | End: 2023-05-09

## 2023-05-09 RX ORDER — SODIUM CHLORIDE 9 MG/ML
500 INJECTION, SOLUTION INTRAVENOUS ONCE
Status: COMPLETED | OUTPATIENT
Start: 2023-05-09 | End: 2023-05-09

## 2023-05-09 RX ADMIN — CALCIUM GLUCONATE 1 G: 20 INJECTION, SOLUTION INTRAVENOUS at 07:41

## 2023-05-09 RX ADMIN — CLINDAMYCIN PHOSPHATE 900 MG: 900 INJECTION, SOLUTION INTRAVENOUS at 06:49

## 2023-05-09 RX ADMIN — ENOXAPARIN SODIUM 40 MG: 40 INJECTION SUBCUTANEOUS at 17:19

## 2023-05-09 RX ADMIN — THERA TABS 1 TABLET: TAB at 05:53

## 2023-05-09 RX ADMIN — THIAMINE HCL TAB 100 MG 100 MG: 100 TAB at 05:53

## 2023-05-09 RX ADMIN — CEFTRIAXONE SODIUM 2000 MG: 2 INJECTION, POWDER, FOR SOLUTION INTRAMUSCULAR; INTRAVENOUS at 05:54

## 2023-05-09 RX ADMIN — MAGNESIUM SULFATE 2 G: 2 INJECTION INTRAVENOUS at 08:31

## 2023-05-09 RX ADMIN — CLINDAMYCIN PHOSPHATE 900 MG: 900 INJECTION, SOLUTION INTRAVENOUS at 22:52

## 2023-05-09 RX ADMIN — CEFTRIAXONE SODIUM 2000 MG: 2 INJECTION, POWDER, FOR SOLUTION INTRAMUSCULAR; INTRAVENOUS at 17:20

## 2023-05-09 RX ADMIN — SODIUM CHLORIDE 500 ML: 9 INJECTION, SOLUTION INTRAVENOUS at 04:25

## 2023-05-09 RX ADMIN — CLINDAMYCIN PHOSPHATE 900 MG: 900 INJECTION, SOLUTION INTRAVENOUS at 14:22

## 2023-05-09 RX ADMIN — FOLIC ACID 1 MG: 1 TABLET ORAL at 05:53

## 2023-05-09 ASSESSMENT — LIFESTYLE VARIABLES
TOTAL SCORE: 2
ORIENTATION AND CLOUDING OF SENSORIUM: ORIENTED AND CAN DO SERIAL ADDITIONS
NAUSEA AND VOMITING: NO NAUSEA AND NO VOMITING
TOTAL SCORE: 1
PAROXYSMAL SWEATS: NO SWEAT VISIBLE
PAROXYSMAL SWEATS: NO SWEAT VISIBLE
TOTAL SCORE: 0
AGITATION: NORMAL ACTIVITY
VISUAL DISTURBANCES: NOT PRESENT
ORIENTATION AND CLOUDING OF SENSORIUM: ORIENTED AND CAN DO SERIAL ADDITIONS
AUDITORY DISTURBANCES: NOT PRESENT
PAROXYSMAL SWEATS: NO SWEAT VISIBLE
PAROXYSMAL SWEATS: NO SWEAT VISIBLE
TOTAL SCORE: 0
AGITATION: NORMAL ACTIVITY
PAROXYSMAL SWEATS: NO SWEAT VISIBLE
VISUAL DISTURBANCES: NOT PRESENT
VISUAL DISTURBANCES: NOT PRESENT
ANXIETY: NO ANXIETY (AT EASE)
VISUAL DISTURBANCES: NOT PRESENT
TREMOR: NO TREMOR
ORIENTATION AND CLOUDING OF SENSORIUM: ORIENTED AND CAN DO SERIAL ADDITIONS
ANXIETY: MILDLY ANXIOUS
VISUAL DISTURBANCES: NOT PRESENT
AUDITORY DISTURBANCES: NOT PRESENT
ANXIETY: NO ANXIETY (AT EASE)
NAUSEA AND VOMITING: NO NAUSEA AND NO VOMITING
ANXIETY: NO ANXIETY (AT EASE)
AUDITORY DISTURBANCES: VERY MILD HARSHNESS OR ABILITY TO FRIGHTEN
HEADACHE, FULLNESS IN HEAD: NOT PRESENT
AGITATION: NORMAL ACTIVITY
TREMOR: NO TREMOR
TOTAL SCORE: 1
HEADACHE, FULLNESS IN HEAD: NOT PRESENT
VISUAL DISTURBANCES: NOT PRESENT
HEADACHE, FULLNESS IN HEAD: NOT PRESENT
HEADACHE, FULLNESS IN HEAD: VERY MILD
NAUSEA AND VOMITING: NO NAUSEA AND NO VOMITING
AUDITORY DISTURBANCES: NOT PRESENT
NAUSEA AND VOMITING: NO NAUSEA AND NO VOMITING
NAUSEA AND VOMITING: NO NAUSEA AND NO VOMITING
HEADACHE, FULLNESS IN HEAD: NOT PRESENT
PAROXYSMAL SWEATS: NO SWEAT VISIBLE
HEADACHE, FULLNESS IN HEAD: NOT PRESENT
AGITATION: SOMEWHAT MORE THAN NORMAL ACTIVITY
TREMOR: NO TREMOR
TOTAL SCORE: 1
TREMOR: NO TREMOR
AGITATION: NORMAL ACTIVITY
AUDITORY DISTURBANCES: NOT PRESENT
ANXIETY: NO ANXIETY (AT EASE)
ORIENTATION AND CLOUDING OF SENSORIUM: ORIENTED AND CAN DO SERIAL ADDITIONS
AGITATION: NORMAL ACTIVITY
ANXIETY: NO ANXIETY (AT EASE)
AUDITORY DISTURBANCES: VERY MILD HARSHNESS OR ABILITY TO FRIGHTEN
NAUSEA AND VOMITING: NO NAUSEA AND NO VOMITING

## 2023-05-09 ASSESSMENT — PAIN DESCRIPTION - PAIN TYPE
TYPE: ACUTE PAIN

## 2023-05-09 NOTE — ASSESSMENT & PLAN NOTE
This is Severe Sepsis Not present on admission  SIRS criteria identified on my evaluation include: Leukocytosis, with WBC greater than 12,000  Clinical indicators of end organ dysfunction include Systolic blood pressure (SBP) <90 mmHg or mean arterial pressure <65 mmHg  Source is Bacteremia  Sepsis protocol initiated  Crystalloid Fluid Administration: Fluid resuscitation ordered per standard protocol - 30 mL/kg per current or ideal body weight  IV antibiotics as appropriate for source of sepsis  Reassessment: I have reassessed the patient's hemodynamic status     Patient now progressing towards septic shock from her Grp A strep bacteremia - potentially 2/2 TSS vs inadequate abx coverage w/clindamycin monotherapy.      - rec starting ceftriaxone and continuing clindamycin (tolerated CTX in 8/2022 per chart review)      Patient did meet sepsis criteria on admission, secondary to lower extremity cellulitis.
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
## 2023-05-09 NOTE — PROGRESS NOTES
2225: CIWA score of 8, one mg of ativan given see MAR. Pt ambulated up to commode and tolerated well.     0400: am serum sodium is 127. Dr Sharma notified and order received for NS. LR DC'd

## 2023-05-09 NOTE — ASSESSMENT & PLAN NOTE
Patient does have a history of alcohol use, no current alcohol withdrawal symptoms noted  Continue to monitor for withdrawal symptoms  Alcohol cessation counseling once patient has improved

## 2023-05-09 NOTE — ASSESSMENT & PLAN NOTE
Hemoglobin 11.4, no signs of bleeding  Likely due to chronic alcohol use chronic disease.  Will need outpatient follow-up.   We will continue to monitor hemoglobin for any signs of acute bleed

## 2023-05-09 NOTE — ASSESSMENT & PLAN NOTE
Right lower extremity erythematous, edematous and tender to touch.  Appears acutely infected  Continue with current antibiotics  Follow cultures  Improving

## 2023-05-09 NOTE — ASSESSMENT & PLAN NOTE
Apparently drinks daily but last drink was a few days ago. No e/o etoh withdrawal at this time    - thiamine, folate, multivitamin  - monitor

## 2023-05-09 NOTE — CARE PLAN
The patient is Stable - Low risk of patient condition declining or worsening    Shift Goals  Clinical Goals: no withdrawl symtoms, improved sepsis  Patient Goals: get some sleep  Family Goals: ronald    Progress made toward(s) clinical / shift goals:    Problem: Knowledge Deficit - Standard  Goal: Patient and family/care givers will demonstrate understanding of plan of care, disease process/condition, diagnostic tests and medications  Description: Target End Date:  1-3 days or as soon as patient condition allows    Document in Patient Education    1.  Patient and family/caregiver oriented to unit, equipment, visitation policy and means for communicating concern  2.  Complete/review Learning Assessment  3.  Assess knowledge level of disease process/condition, treatment plan, diagnostic tests and medications  4.  Explain disease process/condition, treatment plan, diagnostic tests and medications  Outcome: Progressing     Problem: Fall Risk  Goal: Patient will remain free from falls  Description: Target End Date:  Prior to discharge or change in level of care    Document interventions on the [facility] Fall Risk Assessment    1.  Assess for fall risk factors  2.  Implement fall precautions  Outcome: Progressing     Problem: Pain - Standard  Goal: Alleviation of pain or a reduction in pain to the patient’s comfort goal  Description: Target End Date:  Prior to discharge or change in level of care    Document on Vitals flowsheet    1.  Document pain using the appropriate pain scale per order or unit policy  2.  Educate and implement non-pharmacologic comfort measures (i.e. relaxation, distraction, massage, cold/heat therapy, etc.)  3.  Pain management medications as ordered  4.  Reassess pain after pain med administration per policy  5.  If opiods administered assess patient's response to pain medication is appropriate per POSS sedation scale  6.  Follow pain management plan developed in collaboration with patient and  interdisciplinary team (including palliative care or pain specialists if applicable)  Outcome: Progressing     Problem: Optimal Care for Alcohol Withdrawal  Goal: Optimal Care for the alcohol withdrawal patient  Description: Target End Date:  1 to 3 days    1.  Alcohol history screening done on admission  2.  CIWA-AR score assessment (includes assessment of nausea/vomiting, tremor, sweats, anxiety, agitation, tactile, visual and auditory disturbances, headache and orientation/sensorium).  Document on CIWA flowsheet.  3.  Follow CIWA-AR score protocol  4.  Frequent reorientation  5.  Monitor for fluid and electrolyte imbalance.  6.  Assess for respiratory depression due to sedation (pulse ox)  7.  Consider thiamine, multivitamins, folic acid and magnesium sulfate per provider order  8.  Collaborate with Social Workers/Case Management  9.  Collaborate with mental health  Outcome: Progressing     Problem: Seizure Precautions  Goal: Implementation of seizure precautions  Description: Target End Date:  Prior to discharge or change in level of care    1.  Padded side rails up at all times  2.  Suction equipment and oxygen delivery system at bedside  3.  Continuous pulse oximeter in use  4.  Implement fall precautions, bed alarm on, bed in lowest position  5.  IV access (per order)  6.  Provide low stimulus environment, avoid exposure to triggers  7.  Instruct patient to use call light/seizure button if having warning signs of impending seizure  Outcome: Progressing     Problem: Lifestyle Changes  Goal: Patient's ability to identify lifestyle changes and available resources to help reduce recurrence of condition will improve  Description: Target End Date:  1 to 3 days    1.  Discuss recommended lifestyle changes  2.  Identify available resources and support systems  3.  Consider referral to substance abuse program  Outcome: Progressing     Problem: Psychosocial  Goal: Patient's level of anxiety will decrease  Description:  Target End Date:  1-3 days or as soon as patient condition allows    1.  Collaborate with patient and family/caregiver to identify triggers and develop strategies to cope with anxiety  2.  Implement stimuli reduction, calming techniques  3.  Pharmacologic management per provider order  4.  Encourage patient/family/care giver participation  5.  Collaborate with interdisciplinary team including Psychologist or Behavioral Health Team as needed  Outcome: Progressing  Goal: Spiritual and cultural needs incorporated into hospitalization  Description: Target End Date:  End of day 1    1.  Encourage verbalization of feelings, concerns, expectations and needs  2.  Collaborate with Case Management/  3.  Collaborate with Pastoral Care to meet spiritual needs  Outcome: Progressing     Problem: Risk for Aspiration  Goal: Patient's risk for aspiration will be absent or decrease  Description: Target End Date:  Prior to discharge or change in level of care    1.   Complete dysphagia screening on admission  2.   NPO until dysphagia screening complete or medically cleared  3.   Collaborate with Speech Therapy, Clinical Dietitian and interdisciplinary team  4.   Implement aspiration precautions  5.   Assist patient up to chair for meals  6.   Elevate head of bed 90 degrees if patient is unable to get out of bed  7.   Encourage small bites  8.   Ensure foods/liquids are of appropriate consistency  9.   Assess for any signs/symptoms of aspiration  10. Assess breath sounds and vital signs after oral intake  Outcome: Progressing       Patient is not progressing towards the following goals:

## 2023-05-09 NOTE — PROGRESS NOTES
12-hour chart check complete.     Monitor Summary  Rhythm:SR  Rate: 92  Ectopy: none  Measurements: .20/.10/.36
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
## 2023-05-10 PROBLEM — Z02.9 DISCHARGE PLANNING ISSUES: Status: ACTIVE | Noted: 2023-05-10

## 2023-05-10 LAB
ANION GAP SERPL CALC-SCNC: 10 MMOL/L (ref 7–16)
BUN SERPL-MCNC: 13 MG/DL (ref 8–22)
CA-I SERPL-SCNC: 1.06 MMOL/L (ref 1.1–1.3)
CALCIUM SERPL-MCNC: 8.1 MG/DL (ref 8.4–10.2)
CHLORIDE SERPL-SCNC: 96 MMOL/L (ref 96–112)
CO2 SERPL-SCNC: 20 MMOL/L (ref 20–33)
CREAT SERPL-MCNC: 0.56 MG/DL (ref 0.5–1.4)
ERYTHROCYTE [DISTWIDTH] IN BLOOD BY AUTOMATED COUNT: 46.7 FL (ref 35.9–50)
GFR SERPLBLD CREATININE-BSD FMLA CKD-EPI: 98 ML/MIN/1.73 M 2
GLUCOSE SERPL-MCNC: 154 MG/DL (ref 65–99)
HCT VFR BLD AUTO: 34.4 % (ref 37–47)
HGB BLD-MCNC: 11.2 G/DL (ref 12–16)
MAGNESIUM SERPL-MCNC: 1.8 MG/DL (ref 1.5–2.5)
MCH RBC QN AUTO: 29.4 PG (ref 27–33)
MCHC RBC AUTO-ENTMCNC: 32.6 G/DL (ref 33.6–35)
MCV RBC AUTO: 90.3 FL (ref 81.4–97.8)
PHOSPHATE SERPL-MCNC: 2.9 MG/DL (ref 2.5–4.5)
PLATELET # BLD AUTO: 271 K/UL (ref 164–446)
PMV BLD AUTO: 9 FL (ref 9–12.9)
POTASSIUM SERPL-SCNC: 4.2 MMOL/L (ref 3.6–5.5)
RBC # BLD AUTO: 3.81 M/UL (ref 4.2–5.4)
SODIUM SERPL-SCNC: 126 MMOL/L (ref 135–145)
WBC # BLD AUTO: 10.7 K/UL (ref 4.8–10.8)

## 2023-05-10 PROCEDURE — A9270 NON-COVERED ITEM OR SERVICE: HCPCS | Performed by: HOSPITALIST

## 2023-05-10 PROCEDURE — A9270 NON-COVERED ITEM OR SERVICE: HCPCS | Performed by: INTERNAL MEDICINE

## 2023-05-10 PROCEDURE — 82330 ASSAY OF CALCIUM: CPT

## 2023-05-10 PROCEDURE — 97535 SELF CARE MNGMENT TRAINING: CPT

## 2023-05-10 PROCEDURE — 700111 HCHG RX REV CODE 636 W/ 250 OVERRIDE (IP): Performed by: INTERNAL MEDICINE

## 2023-05-10 PROCEDURE — 700111 HCHG RX REV CODE 636 W/ 250 OVERRIDE (IP): Performed by: STUDENT IN AN ORGANIZED HEALTH CARE EDUCATION/TRAINING PROGRAM

## 2023-05-10 PROCEDURE — 700102 HCHG RX REV CODE 250 W/ 637 OVERRIDE(OP): Performed by: INTERNAL MEDICINE

## 2023-05-10 PROCEDURE — 700105 HCHG RX REV CODE 258: Performed by: STUDENT IN AN ORGANIZED HEALTH CARE EDUCATION/TRAINING PROGRAM

## 2023-05-10 PROCEDURE — 80048 BASIC METABOLIC PNL TOTAL CA: CPT

## 2023-05-10 PROCEDURE — 700102 HCHG RX REV CODE 250 W/ 637 OVERRIDE(OP): Performed by: HOSPITALIST

## 2023-05-10 PROCEDURE — 36415 COLL VENOUS BLD VENIPUNCTURE: CPT

## 2023-05-10 PROCEDURE — 700105 HCHG RX REV CODE 258: Performed by: INTERNAL MEDICINE

## 2023-05-10 PROCEDURE — 770020 HCHG ROOM/CARE - TELE (206)

## 2023-05-10 PROCEDURE — A9270 NON-COVERED ITEM OR SERVICE: HCPCS | Performed by: STUDENT IN AN ORGANIZED HEALTH CARE EDUCATION/TRAINING PROGRAM

## 2023-05-10 PROCEDURE — 94760 N-INVAS EAR/PLS OXIMETRY 1: CPT

## 2023-05-10 PROCEDURE — 84100 ASSAY OF PHOSPHORUS: CPT

## 2023-05-10 PROCEDURE — 700102 HCHG RX REV CODE 250 W/ 637 OVERRIDE(OP): Performed by: STUDENT IN AN ORGANIZED HEALTH CARE EDUCATION/TRAINING PROGRAM

## 2023-05-10 PROCEDURE — 97161 PT EVAL LOW COMPLEX 20 MIN: CPT

## 2023-05-10 PROCEDURE — 700101 HCHG RX REV CODE 250: Performed by: STUDENT IN AN ORGANIZED HEALTH CARE EDUCATION/TRAINING PROGRAM

## 2023-05-10 PROCEDURE — 85027 COMPLETE CBC AUTOMATED: CPT

## 2023-05-10 PROCEDURE — 99232 SBSQ HOSP IP/OBS MODERATE 35: CPT | Performed by: INTERNAL MEDICINE

## 2023-05-10 PROCEDURE — 83735 ASSAY OF MAGNESIUM: CPT

## 2023-05-10 RX ORDER — ACETAMINOPHEN 325 MG/1
650 TABLET ORAL EVERY 4 HOURS PRN
Status: DISCONTINUED | OUTPATIENT
Start: 2023-05-10 | End: 2023-05-11 | Stop reason: HOSPADM

## 2023-05-10 RX ORDER — MORPHINE SULFATE 4 MG/ML
1 INJECTION INTRAVENOUS ONCE
Status: ACTIVE | OUTPATIENT
Start: 2023-05-10 | End: 2023-05-11

## 2023-05-10 RX ORDER — NICOTINE 21 MG/24HR
1 PATCH, TRANSDERMAL 24 HOURS TRANSDERMAL
Status: DISCONTINUED | OUTPATIENT
Start: 2023-05-10 | End: 2023-05-11 | Stop reason: HOSPADM

## 2023-05-10 RX ADMIN — ACETAMINOPHEN 650 MG: 325 TABLET ORAL at 15:21

## 2023-05-10 RX ADMIN — FOLIC ACID 1 MG: 1 TABLET ORAL at 04:41

## 2023-05-10 RX ADMIN — SENNOSIDES AND DOCUSATE SODIUM 2 TABLET: 50; 8.6 TABLET ORAL at 04:41

## 2023-05-10 RX ADMIN — CEFTRIAXONE SODIUM 2000 MG: 2 INJECTION, POWDER, FOR SOLUTION INTRAMUSCULAR; INTRAVENOUS at 04:34

## 2023-05-10 RX ADMIN — THIAMINE HCL TAB 100 MG 100 MG: 100 TAB at 04:41

## 2023-05-10 RX ADMIN — ACETAMINOPHEN 650 MG: 325 TABLET ORAL at 21:44

## 2023-05-10 RX ADMIN — NICOTINE TRANSDERMAL SYSTEM 21 MG: 21 PATCH, EXTENDED RELEASE TRANSDERMAL at 13:54

## 2023-05-10 RX ADMIN — CEFAZOLIN 2 G: 2 INJECTION, POWDER, FOR SOLUTION INTRAMUSCULAR; INTRAVENOUS at 21:44

## 2023-05-10 RX ADMIN — ACETAMINOPHEN 650 MG: 325 TABLET ORAL at 04:41

## 2023-05-10 RX ADMIN — THERA TABS 1 TABLET: TAB at 04:41

## 2023-05-10 RX ADMIN — ENOXAPARIN SODIUM 40 MG: 40 INJECTION SUBCUTANEOUS at 17:27

## 2023-05-10 RX ADMIN — CEFAZOLIN 2 G: 2 INJECTION, POWDER, FOR SOLUTION INTRAMUSCULAR; INTRAVENOUS at 15:20

## 2023-05-10 RX ADMIN — CLINDAMYCIN PHOSPHATE 900 MG: 900 INJECTION, SOLUTION INTRAVENOUS at 05:14

## 2023-05-10 ASSESSMENT — COGNITIVE AND FUNCTIONAL STATUS - GENERAL
MOBILITY SCORE: 22
MOVING FROM LYING ON BACK TO SITTING ON SIDE OF FLAT BED: A LITTLE
SUGGESTED CMS G CODE MODIFIER DAILY ACTIVITY: CJ
WALKING IN HOSPITAL ROOM: A LITTLE
SUGGESTED CMS G CODE MODIFIER MOBILITY: CK
CLIMB 3 TO 5 STEPS WITH RAILING: A LITTLE
CLIMB 3 TO 5 STEPS WITH RAILING: A LITTLE
MOVING TO AND FROM BED TO CHAIR: A LITTLE
SUGGESTED CMS G CODE MODIFIER MOBILITY: CJ
DAILY ACTIVITIY SCORE: 22
STANDING UP FROM CHAIR USING ARMS: A LITTLE
HELP NEEDED FOR BATHING: A LITTLE
TOILETING: A LITTLE
WALKING IN HOSPITAL ROOM: A LITTLE
MOBILITY SCORE: 19

## 2023-05-10 ASSESSMENT — LIFESTYLE VARIABLES
HEADACHE, FULLNESS IN HEAD: NOT PRESENT
AUDITORY DISTURBANCES: NOT PRESENT
VISUAL DISTURBANCES: NOT PRESENT
TREMOR: NO TREMOR
TOTAL SCORE: 1
AGITATION: NORMAL ACTIVITY
ORIENTATION AND CLOUDING OF SENSORIUM: ORIENTED AND CAN DO SERIAL ADDITIONS
AUDITORY DISTURBANCES: NOT PRESENT
PAROXYSMAL SWEATS: NO SWEAT VISIBLE
ANXIETY: MILDLY ANXIOUS
ANXIETY: NO ANXIETY (AT EASE)
VISUAL DISTURBANCES: NOT PRESENT
TREMOR: NO TREMOR
TOTAL SCORE: 0
NAUSEA AND VOMITING: NO NAUSEA AND NO VOMITING
TREMOR: NO TREMOR
TOTAL SCORE: 1
PAROXYSMAL SWEATS: NO SWEAT VISIBLE
VISUAL DISTURBANCES: NOT PRESENT
NAUSEA AND VOMITING: NO NAUSEA AND NO VOMITING
NAUSEA AND VOMITING: NO NAUSEA AND NO VOMITING
AUDITORY DISTURBANCES: NOT PRESENT
TOTAL SCORE: 2
ANXIETY: MILDLY ANXIOUS
TREMOR: NO TREMOR
HEADACHE, FULLNESS IN HEAD: NOT PRESENT
HEADACHE, FULLNESS IN HEAD: VERY MILD
ANXIETY: NO ANXIETY (AT EASE)
PAROXYSMAL SWEATS: NO SWEAT VISIBLE
HEADACHE, FULLNESS IN HEAD: NOT PRESENT
PAROXYSMAL SWEATS: NO SWEAT VISIBLE
PAROXYSMAL SWEATS: NO SWEAT VISIBLE
ANXIETY: MILDLY ANXIOUS
ORIENTATION AND CLOUDING OF SENSORIUM: ORIENTED AND CAN DO SERIAL ADDITIONS
AGITATION: NORMAL ACTIVITY
NAUSEA AND VOMITING: NO NAUSEA AND NO VOMITING
ORIENTATION AND CLOUDING OF SENSORIUM: ORIENTED AND CAN DO SERIAL ADDITIONS
HEADACHE, FULLNESS IN HEAD: NOT PRESENT
TOTAL SCORE: 0
ORIENTATION AND CLOUDING OF SENSORIUM: ORIENTED AND CAN DO SERIAL ADDITIONS
TREMOR: NO TREMOR
NAUSEA AND VOMITING: NO NAUSEA AND NO VOMITING
AGITATION: NORMAL ACTIVITY
ORIENTATION AND CLOUDING OF SENSORIUM: ORIENTED AND CAN DO SERIAL ADDITIONS
AUDITORY DISTURBANCES: NOT PRESENT
NAUSEA AND VOMITING: NO NAUSEA AND NO VOMITING
AUDITORY DISTURBANCES: NOT PRESENT
TOTAL SCORE: 2
VISUAL DISTURBANCES: NOT PRESENT
AGITATION: NORMAL ACTIVITY
HEADACHE, FULLNESS IN HEAD: NOT PRESENT
ORIENTATION AND CLOUDING OF SENSORIUM: ORIENTED AND CAN DO SERIAL ADDITIONS
ANXIETY: *
PAROXYSMAL SWEATS: NO SWEAT VISIBLE
TREMOR: NO TREMOR
VISUAL DISTURBANCES: NOT PRESENT
AGITATION: NORMAL ACTIVITY
AGITATION: NORMAL ACTIVITY
AUDITORY DISTURBANCES: NOT PRESENT
VISUAL DISTURBANCES: NOT PRESENT

## 2023-05-10 ASSESSMENT — GAIT ASSESSMENTS
DEVIATION: OTHER (COMMENT)
GAIT LEVEL OF ASSIST: SUPERVISED

## 2023-05-10 ASSESSMENT — ENCOUNTER SYMPTOMS: NERVOUS/ANXIOUS: 1

## 2023-05-10 ASSESSMENT — PAIN DESCRIPTION - PAIN TYPE
TYPE: ACUTE PAIN

## 2023-05-10 ASSESSMENT — FIBROSIS 4 INDEX: FIB4 SCORE: 1.45

## 2023-05-10 NOTE — PROGRESS NOTES
Received report from DARIN Edge at 0005, seen pt AO4 resting in bed. Ambulated to the bathroom x1 assist. O2 at 3L via NC.   Bed kept locked and lowest position.  Bed and strip alarm on.   03:41 am pt reporting shoulder and leg pain requesting pain med. MD made aware, of pain, BNP, EF and latest labs NA at 126. Tylenol and morphine was ordered. Will medicate per mar.  Kept monitored.

## 2023-05-10 NOTE — CARE PLAN
The patient is Stable - Low risk of patient condition declining or worsening    Shift Goals  Clinical Goals: comfort, stable vital signs  Patient Goals: sleep  Family Goals: ALEX    Progress made toward(s) clinical / shift goals:    Problem: Knowledge Deficit - Standard  Goal: Patient and family/care givers will demonstrate understanding of plan of care, disease process/condition, diagnostic tests and medications  Outcome: Progressing     Problem: Fall Risk  Goal: Patient will remain free from falls  Outcome: Progressing     Problem: Pain - Standard  Goal: Alleviation of pain or a reduction in pain to the patient’s comfort goal  Outcome: Progressing     Problem: Psychosocial  Goal: Patient's level of anxiety will decrease  Outcome: Progressing     Problem: Risk for Aspiration  Goal: Patient's risk for aspiration will be absent or decrease  Outcome: Progressing       Patient is not progressing towards the following goals:

## 2023-05-10 NOTE — ASSESSMENT & PLAN NOTE
PT evaluated patient and patient would benefit from group home or home wellness checks as it is questionable whether or not she has enough support at home to take care of herself.  Discussed with case management and they are working on options for discharge
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
## 2023-05-10 NOTE — PROGRESS NOTES
Telemetry Shift Summary     Rhythm: SR  HR: 81-88  Ectopy: r PAC  PSVT up to 189    Measurements: 0.16/0.10/0.38    Normal Values  Rhythm: SR  HR:   Measurements: 0.12-0.20/0.08-0.10/0.30-0.52

## 2023-05-10 NOTE — PROGRESS NOTES
Waldo Ramirez MD;  Location: [facility] OR;  Service: OB/GYN;  Laterality: Bilateral;    ROBOT-ASSISTED SURGICAL REMOVAL OF FALLOPIAN TUBE USING DA EDILBERTO XI  05/25/2021    Procedure: XI ROBOTIC SALPINGECTOMY;  Surgeon: Waldo Ramirez MD;  Location: [facility] OR;  Service: OB/GYN;;       SOCIAL HISTORY:  Social History     Socioeconomic History    Marital status:  12-hour chart check complete.    Monitor Summary  Rhythm: SR  Rate: 80-92  Ectopy: -  Measurements: .18/.08/.34       sounds.   Pulmonary:      Effort: Pulmonary effort is normal.      Breath sounds: Normal breath sounds.   Skin:     General: Skin is warm and dry.   Neurological:      Mental Status: She is alert and oriented to person, place, and time.         Health Maintenance         

## 2023-05-10 NOTE — CARE PLAN
Problem: Knowledge Deficit - Standard  Goal: Patient and family/care givers will demonstrate understanding of plan of care, disease process/condition, diagnostic tests and medications  Outcome: Not Progressing     Problem: Lifestyle Changes  Goal: Patient's ability to identify lifestyle changes and available resources to help reduce recurrence of condition will improve  Outcome: Not Progressing     Problem: Psychosocial  Goal: Patient's level of anxiety will decrease  Outcome: Progressing     The patient is Watcher - Medium risk of patient condition declining or worsening    Shift Goals  Clinical Goals: lab improvement, stable BP  Patient Goals: get better  Family Goals: ronald    Progress made toward(s) clinical / shift goals:  VSS, patient transfer to med/tele.     Patient is not progressing towards the following goals:      Problem: Knowledge Deficit - Standard  Goal: Patient and family/care givers will demonstrate understanding of plan of care, disease process/condition, diagnostic tests and medications  Outcome: Not Progressing     Problem: Lifestyle Changes  Goal: Patient's ability to identify lifestyle changes and available resources to help reduce recurrence of condition will improve  Outcome: Not Progressing

## 2023-05-10 NOTE — THERAPY
Physical Therapy   Initial Evaluation     Patient Name: Mora Guzman  Age:  73 y.o., Sex:  female  Medical Record #: [id number]  Today's Date: 5/10/2023     Precautions  Precautions: (P) Fall Risk    Assessment  Patient is 73 y.o. female.   admitted 5/8/2023 with lower extremity swelling. RLE erythema and swelling that she states has been chronic for several months. Hx ETOH and meth use. Pt demo's safe and indep amb without AD. Cleared on stairs. Questionable safe environment at home. Pt has conflicting stories. No family support. Recommend group home if available. If D/C home recommend home a well check. .      Plan    Physical Therapy Initial Treatment Plan   Treatment Plan : (P) Self Care / Home Evaluation, Stair Training, Therapeutic Activities, Gait Training  Treatment Frequency: (P) Other (See Comments)  Duration: (P) Discharge Needs Only (eval)    DC Equipment Recommendations: (P) None  Discharge Recommendations: (P) Other - (recommend group home or homo with well check)            05/10/23 0938   Charge Group   PT Evaluation PT Evaluation Low   PT Self Care / Home Evaluation (Units) 1   Total Time Spent   PT Evaluation Time Spent (Mins) 20   PT Self Care/Home Evaluation Time Spent (Mins) 12    Services   Is patient using  services for this encounter? No   Prior Level of Functional Mobility   Distance Ambulation (Feet) 200   Initial Contact Note    Initial Contact Note Order Received and Verified, Evaluation Only - Patient Does Not Require Further Acute Physical Therapy at this Time.  However, May Benefit from Post Acute Therapy for Higher Level Functional Deficits.   Precautions   Precautions Fall Risk   Pain 0 - 10 Group   Therapist Pain Assessment Nurse Notified;Post Activity Pain Same as Prior to Activity;0   Prior Living Situation   Prior Services Home-Independent   Housing / Facility 1 Story House   Steps Into Home 2   Steps In Home 0   Rail Both Rail (Steps into Home)   Equipment  "Owned None   Lives with - Patient's Self Care Capacity Significant Other;Alone and Able to Care For Self   Comments pt tearful talking about home situation. lives with 3 cats, a chicken, 1 dog and fish in the home. Boyfirend taking care animals. Pt \" has boxes piled everywhere in the house\"   Prior Level of Functional Mobility   Bed Mobility Independent   Transfer Status Independent   Ambulation Independent   Assistive Devices Used None   Comments pt states being totally indep with all ADL's , gait without aD.   History of Falls   History of Falls Yes   Date of Last Fall   (pt unable to state when and where)   Cognition    Cognition / Consciousness X   Level of Consciousness Alert   Comments Pt has conflicting stories about home situation . Lacks family support.   Passive ROM Lower Body   Passive ROM Lower Body WDL   Active ROM Lower Body    Active ROM Lower Body  WDL   Strength Lower Body   Lower Body Strength  X   Comments RLE swelling affecting ankle ROM, pt tends to not bear as much wt in RLE when amb.   Sensation Lower Body   Lower Extremity Sensation   WDL   Lower Body Muscle Tone   Lower Body Muscle Tone  WDL   Neurological Concerns   Neurological Concerns No   Coordination Lower Body    Coordination Lower Body  WDL   Vision   Vision Comments L eye blindness, sees shadows   Other Treatments   Other Treatments Provided home safety. slowing pace during turns   Balance Assessment   Sitting Balance (Static) Good   Sitting Balance (Dynamic) Fair +   Standing Balance (Static) Fair +   Standing Balance (Dynamic) Fair   Weight Shift Sitting Good   Weight Shift Standing Fair   Comments L leg discrep. swaty to R x 1 with pt able to regain midline indep   Bed Mobility    Supine to Sit Independent   Sit to Supine Independent   Scooting Independent   Rolling Independent   Gait Analysis   Gait Level Of Assist Supervised   Assistive Device None   # of Times Distance was Traveled 200   Deviation Other (Comment)  (L leg " length discrep)   # of Stairs Climbed 2   Level of Assist with Stairs Supervised   Weight Bearing Status full   Vision Deficits Impacting Mobility L eye blindness   Functional Mobility   Sit to Stand Supervised   How much difficulty does the patient currently have...   Turning over in bed (including adjusting bedclothes, sheets and blankets)? 4   Sitting down on and standing up from a chair with arms (e.g., wheelchair, bedside commode, etc.) 4   Moving from lying on back to sitting on the side of the bed? 4   How much help from another person does the patient currently need...   Moving to and from a bed to a chair (including a wheelchair)? 4   Need to walk in a hospital room? 3   Climbing 3-5 steps with a railing? 3   6 clicks Mobility Score 22   Activity Tolerance   Sitting Edge of Bed 10 mins   Standing 12 mins   Edema / Skin Assessment   Comments RLE erthema and swelling   Education Group   Education Provided Role of Physical Therapist   Role of Physical Therapist Patient Response Patient;Acceptance;Explanation;Verbal Demonstration   Physical Therapy Initial Treatment Plan    Treatment Plan  Self Care / Home Evaluation;Stair Training;Therapeutic Activities;Gait Training   Treatment Frequency Other (See Comments)   Duration Discharge Needs Only  (eval)   Problem List    Problems Decreased Activity Tolerance   Anticipated Discharge Equipment and Recommendations   DC Equipment Recommendations None   Discharge Recommendations Other -  (recommend group home or homo with well check)   Interdisciplinary Plan of Care Collaboration   IDT Collaboration with  Nursing   Patient Position at End of Therapy In Bed;Bed Alarm On;Phone within Reach;Tray Table within Reach;Call Light within Reach   Collaboration Comments re: eval   Session Information   Date / Session Number  5/10/23- eval done. D/C needs only

## 2023-05-10 NOTE — DISCHARGE PLANNING
Care Transition Team Assessment      RN CM spoke to pt at bedside. Per Pt  she lives alone in a 1 story house at [place], [place] with 3 steps to enter home, independent with all ADL's prior to admission. Pt also states that she would ride her bicycle or walk to the grocery store. Pt has friends for support, Pt's daughter Jonna lives in [place] and does not assist in patient's care. Pt's also added that she  owns her home and receives $950 a month from [employer]. She states that this covers all her needs.   Pt said that she chews tobacco frequently and drinks 2 glasses of wine with dinner a night.     Pt declines to go to SNF or a group home but agreeable to  services if available in [place].     RN [facility], left voicemail referring pt.    14:40 - DARIN DE OLIVEIRA called [facility], [facility], [facility], none service [place]. Per Manuelito from [facility], no  services [place].     Information Source  Orientation Level: Oriented to place, Oriented X4  Information Given By: Patient  Who is responsible for making decisions for patient? : Patient    Readmission Evaluation  Is this a readmission?: No    Elopement Risk  Legal Hold: No  Ambulatory or Self Mobile in Wheelchair: Yes  Disoriented: No  Psychiatric Symptoms: Impulsivity-at Risk for Elopement  Elopement this Admit: No  Vocalizing Wanting to Leave: No  Displays Behaviors, Body Language Wanting to Leave: No-Not at Risk for Elopement  Elopement Risk: Not at Risk for Elopement  Wanderguard On: No (See Comments)    Interdisciplinary Discharge Planning  Does Admitting Nurse Feel This Could be a Complex Discharge?: No  Lives with - Patient's Self Care Capacity: Significant Other, Alone and Able to Care For Self  Patient or legal guardian wants to designate a caregiver: No  Support Systems: None  Housing / Facility: 1 Story House  Prior Services: Home-Independent  Durable Medical Equipment: Not Applicable    Discharge Preparedness  What are your  discharge supports?: Other (comment) (Friends)  Prior Functional Level: Ambulatory    Functional Assesment  Prior Functional Level: Ambulatory    Finances  Financial Barriers to Discharge: No  Prescription Coverage: Yes    Vision / Hearing Impairment  Vision Impairment : No  Right Eye Vision: Impaired  Left Eye Vision: Impaired  Hearing Impairment : No         Advance Directive  Advance Directive?: None    Domestic Abuse  Have you ever been the victim of abuse or violence?: No  Physical Abuse or Sexual Abuse: No  Verbal Abuse or Emotional Abuse: No  Possible Abuse/Neglect Reported to:: Not Applicable    Psychological Assessment  History of Substance Abuse: Alcohol, Methamphetamine  History of Psychiatric Problems: No  Non-compliant with Treatment: No    Discharge Risks or Barriers  Discharge risks or barriers?: No PCP, Complex medical needs, Lives alone, no community support  Patient risk factors: Vulnerable adult, No PCP, Lack of outside supports, Lives alone and no community support    Anticipated Discharge Information  Discharge Disposition: D/T to home under [facility]HA care in anticipation of covered skilled care (06)

## 2023-05-11 ENCOUNTER — PHARMACY VISIT (OUTPATIENT)
Dept: PHARMACY | Facility: MEDICAL CENTER | Age: 73
End: 2023-05-11
Payer: COMMERCIAL

## 2023-05-11 VITALS
WEIGHT: 173.28 LBS | RESPIRATION RATE: 16 BRPM | SYSTOLIC BLOOD PRESSURE: 154 MMHG | TEMPERATURE: 97.7 F | HEART RATE: 86 BPM | BODY MASS INDEX: 28.87 KG/M2 | OXYGEN SATURATION: 100 % | DIASTOLIC BLOOD PRESSURE: 76 MMHG | HEIGHT: 65 IN

## 2023-05-11 LAB
ANION GAP SERPL CALC-SCNC: 7 MMOL/L (ref 7–16)
BUN SERPL-MCNC: 7 MG/DL (ref 8–22)
CALCIUM SERPL-MCNC: 8.4 MG/DL (ref 8.4–10.2)
CHLORIDE SERPL-SCNC: 105 MMOL/L (ref 96–112)
CO2 SERPL-SCNC: 24 MMOL/L (ref 20–33)
CREAT SERPL-MCNC: 0.5 MG/DL (ref 0.5–1.4)
GFR SERPLBLD CREATININE-BSD FMLA CKD-EPI: 100 ML/MIN/1.73 M 2
GLUCOSE SERPL-MCNC: 104 MG/DL (ref 65–99)
MAGNESIUM SERPL-MCNC: 1.8 MG/DL (ref 1.5–2.5)
PHOSPHATE SERPL-MCNC: 3.5 MG/DL (ref 2.5–4.5)
POTASSIUM SERPL-SCNC: 4.1 MMOL/L (ref 3.6–5.5)
SODIUM SERPL-SCNC: 136 MMOL/L (ref 135–145)

## 2023-05-11 PROCEDURE — 94760 N-INVAS EAR/PLS OXIMETRY 1: CPT

## 2023-05-11 PROCEDURE — 36415 COLL VENOUS BLD VENIPUNCTURE: CPT

## 2023-05-11 PROCEDURE — A9270 NON-COVERED ITEM OR SERVICE: HCPCS | Performed by: HOSPITALIST

## 2023-05-11 PROCEDURE — 99239 HOSP IP/OBS DSCHRG MGMT >30: CPT | Performed by: INTERNAL MEDICINE

## 2023-05-11 PROCEDURE — 80048 BASIC METABOLIC PNL TOTAL CA: CPT

## 2023-05-11 PROCEDURE — A9270 NON-COVERED ITEM OR SERVICE: HCPCS | Performed by: INTERNAL MEDICINE

## 2023-05-11 PROCEDURE — 700102 HCHG RX REV CODE 250 W/ 637 OVERRIDE(OP): Performed by: STUDENT IN AN ORGANIZED HEALTH CARE EDUCATION/TRAINING PROGRAM

## 2023-05-11 PROCEDURE — RXMED WILLOW AMBULATORY MEDICATION CHARGE: Performed by: INTERNAL MEDICINE

## 2023-05-11 PROCEDURE — A9270 NON-COVERED ITEM OR SERVICE: HCPCS | Performed by: STUDENT IN AN ORGANIZED HEALTH CARE EDUCATION/TRAINING PROGRAM

## 2023-05-11 PROCEDURE — 700105 HCHG RX REV CODE 258: Performed by: INTERNAL MEDICINE

## 2023-05-11 PROCEDURE — 700111 HCHG RX REV CODE 636 W/ 250 OVERRIDE (IP): Performed by: INTERNAL MEDICINE

## 2023-05-11 PROCEDURE — 700102 HCHG RX REV CODE 250 W/ 637 OVERRIDE(OP): Performed by: INTERNAL MEDICINE

## 2023-05-11 PROCEDURE — 83735 ASSAY OF MAGNESIUM: CPT

## 2023-05-11 PROCEDURE — 84100 ASSAY OF PHOSPHORUS: CPT

## 2023-05-11 PROCEDURE — 700102 HCHG RX REV CODE 250 W/ 637 OVERRIDE(OP): Performed by: HOSPITALIST

## 2023-05-11 RX ORDER — NICOTINE 21 MG/24HR
1 PATCH, TRANSDERMAL 24 HOURS TRANSDERMAL EVERY 24 HOURS
Qty: 30 PATCH | Refills: 0 | Status: SHIPPED | OUTPATIENT
Start: 2023-05-11 | End: 2023-06-10

## 2023-05-11 RX ORDER — CEPHALEXIN 500 MG/1
500 CAPSULE ORAL 4 TIMES DAILY
Qty: 20 CAPSULE | Refills: 0 | Status: SHIPPED | OUTPATIENT
Start: 2023-05-11 | End: 2023-05-16

## 2023-05-11 RX ADMIN — THERA TABS 1 TABLET: TAB at 04:56

## 2023-05-11 RX ADMIN — THIAMINE HCL TAB 100 MG 100 MG: 100 TAB at 04:56

## 2023-05-11 RX ADMIN — FOLIC ACID 1 MG: 1 TABLET ORAL at 04:56

## 2023-05-11 RX ADMIN — NICOTINE TRANSDERMAL SYSTEM 21 MG: 21 PATCH, EXTENDED RELEASE TRANSDERMAL at 04:57

## 2023-05-11 RX ADMIN — ACETAMINOPHEN 650 MG: 325 TABLET ORAL at 03:59

## 2023-05-11 RX ADMIN — CEFAZOLIN 2 G: 2 INJECTION, POWDER, FOR SOLUTION INTRAMUSCULAR; INTRAVENOUS at 04:56

## 2023-05-11 ASSESSMENT — LIFESTYLE VARIABLES
TREMOR: NO TREMOR
TOTAL SCORE: 2
TOTAL SCORE: 1
ORIENTATION AND CLOUDING OF SENSORIUM: ORIENTED AND CAN DO SERIAL ADDITIONS
AGITATION: NORMAL ACTIVITY
HEADACHE, FULLNESS IN HEAD: VERY MILD
NAUSEA AND VOMITING: NO NAUSEA AND NO VOMITING
VISUAL DISTURBANCES: NOT PRESENT
ORIENTATION AND CLOUDING OF SENSORIUM: ORIENTED AND CAN DO SERIAL ADDITIONS
ORIENTATION AND CLOUDING OF SENSORIUM: ORIENTED AND CAN DO SERIAL ADDITIONS
PAROXYSMAL SWEATS: NO SWEAT VISIBLE
ANXIETY: NO ANXIETY (AT EASE)
NAUSEA AND VOMITING: NO NAUSEA AND NO VOMITING
TOTAL SCORE: 2
NAUSEA AND VOMITING: NO NAUSEA AND NO VOMITING
HEADACHE, FULLNESS IN HEAD: NOT PRESENT
AGITATION: NORMAL ACTIVITY
PAROXYSMAL SWEATS: NO SWEAT VISIBLE
TREMOR: NO TREMOR
ANXIETY: MILDLY ANXIOUS
AUDITORY DISTURBANCES: NOT PRESENT
AUDITORY DISTURBANCES: NOT PRESENT
VISUAL DISTURBANCES: NOT PRESENT
AUDITORY DISTURBANCES: NOT PRESENT
HEADACHE, FULLNESS IN HEAD: MILD
TREMOR: NO TREMOR
AGITATION: SOMEWHAT MORE THAN NORMAL ACTIVITY
VISUAL DISTURBANCES: NOT PRESENT
PAROXYSMAL SWEATS: NO SWEAT VISIBLE
ANXIETY: NO ANXIETY (AT EASE)

## 2023-05-11 NOTE — CARE PLAN
The patient is Stable - Low risk of patient condition declining or worsening    Shift Goals  Clinical Goals: monitor cellulitis, monitor labs  Patient Goals: sleep  Family Goals: ALEX    Progress made toward(s) clinical / shift goals:  CIWA continued. Cellulitis appears to be improving. Patient medicated with prn tylenol for pain. Patient showered with staff.       Problem: Knowledge Deficit - Standard  Goal: Patient and family/care givers will demonstrate understanding of plan of care, disease process/condition, diagnostic tests and medications  Outcome: Progressing     Problem: Fall Risk  Goal: Patient will remain free from falls  Outcome: Progressing     Problem: Psychosocial  Goal: Patient's level of anxiety will decrease  Outcome: Progressing       Patient is not progressing towards the following goals:

## 2023-05-11 NOTE — PROGRESS NOTES
Monitor Summary:    Rhythm:  SR   Rate Range:    Ectopy: rPAC    Measurements:  0.20/0.08/0.36  (Measured by monitor tech)
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
## 2023-05-11 NOTE — DISCHARGE PLANNING
Patient: Prerna Verde    Procedure(s):  Dental Exam, Radiographs, Periodontal Therapy, Impressions, Prophy    Diagnosis: Dental caries [K02.9]  Periodontal disease [K05.6]  Diagnosis Additional Information: No value filed.    Anesthesia Type:   General     Note:  Airway :Face Mask  Patient transferred to:PACU  Comments: Report to PARESH Torres  Pt still sleepy with VSS (charted on intra op record.)  Caregivers,.  RN review post op orders. Atmosphere still calmHandoff Report: Identifed the Patient, Identified the Reponsible Provider, Reviewed the pertinent medical history, Discussed the surgical course, Reviewed Intra-OP anesthesia mangement and issues during anesthesia, Set expectations for post-procedure period and Allowed opportunity for questions and acknowledgement of understanding      Vitals: (Last set prior to Anesthesia Care Transfer)    CRNA VITALS  11/6/2020 1205 - 11/6/2020 1254      11/6/2020             SpO2:  93 %    EKG:  Sinus rhythm                Electronically Signed By: RADHA Penny CRNA  November 6, 2020  12:54 PM   Pt needing transport home. ALVINO RN met with pt at bedside to discuss DC address. Pt stating going to [address] [place] [address]. Pt has [employer] benefits. ALVINO RN called [employer] at [phone] to schedule ride. [employer] schedule for [id number], pt will need to meet with transport at ER entrance. Transport will be with [employer].     1242: ALVINO RN called [employer] to add trip to [facility] to pickup Keflex and Nicoderm. Pt provided with [employer] number if needing to change transport after leaving [facility] [employer] [facility] [employer].

## 2023-05-11 NOTE — CARE PLAN
The patient is Watcher - Medium risk of patient condition declining or worsening    Shift Goals  Clinical Goals: monitor cellulitis and ABx  Patient Goals: sleep  Family Goals: ALEX    Progress made toward(s) clinical / shift goals:  Pt pain is controlled and managed with tylenol, R lig cellulitis is monitored, continue CIWA, ABX, bed alarm on, call light within reach    Patient is not progressing towards the following goals:      Problem: Knowledge Deficit - Standard  Goal: Patient and family/care givers will demonstrate understanding of plan of care, disease process/condition, diagnostic tests and medications  Outcome: Progressing     Problem: Fall Risk  Goal: Patient will remain free from falls  Outcome: Progressing     Problem: Pain - Standard  Goal: Alleviation of pain or a reduction in pain to the patient’s comfort goal  Outcome: Progressing     Problem: Optimal Care for Alcohol Withdrawal  Goal: Optimal Care for the alcohol withdrawal patient  Outcome: Progressing

## 2023-05-12 ENCOUNTER — PATIENT OUTREACH (OUTPATIENT)
Dept: HEALTH INFORMATION MANAGEMENT | Facility: OTHER | Age: 73
End: 2023-05-12
Payer: MEDICARE

## 2023-05-12 NOTE — PROGRESS NOTES
CHW Mary called pt to offer Community Care Management services. CHW was unable to reach pt. CHW provided CCM contact via VM. CHW will try again at a later time.

## 2023-05-13 LAB
BACTERIA BLD CULT: NORMAL
BACTERIA BLD CULT: NORMAL
SIGNIFICANT IND 70042: NORMAL
SIGNIFICANT IND 70042: NORMAL
SITE SITE: NORMAL
SITE SITE: NORMAL
SOURCE SOURCE: NORMAL
SOURCE SOURCE: NORMAL

## 2023-05-26 NOTE — PROGRESS NOTES
CHW Mary attempted to contact pt post discharge to offer Community Care Management services. CHW was unable to reach pt. CCM contact information was provided via  and encouraged pt to call if anything is needed. CHW will no longer continue to follow.

## (undated) DEVICE — TOWEL STOP TIMEOUT SAFETY FLAG (40EA/CA)

## (undated) DEVICE — SLEEVE VASO CALF MED - (10PR/CA)

## (undated) DEVICE — KNIFE MICROSURGICAL 15 DEGREE GREEN

## (undated) DEVICE — TUBING CLEARLINK DUO-VENT - C-FLO (48EA/CA)

## (undated) DEVICE — WATER IRRIGATION STERILE 1000ML (12EA/CA)

## (undated) DEVICE — SUCTION INSTRUMENT YANKAUER BULBOUS TIP W/O VENT (50EA/CA)

## (undated) DEVICE — TIP NEEDLE SOFT 25G X 0.8MM (10EA/BX)

## (undated) DEVICE — SYRINGE SAFETY TB 1 ML 27 GA X 1/2 IN (100/BX 4BX/CA)

## (undated) DEVICE — SUTURE GENERAL

## (undated) DEVICE — PAD EYE GAUZE COVERED OVAL 1 5/8 X 2 5/8" STERILE"

## (undated) DEVICE — GLOVE SZ 7 BIOGEL PI MICRO - PF LF (50PR/BX 4BX/CA)

## (undated) DEVICE — SUTURE 6-0 PLAIN GUT G-1 D/A 18 (12PK/BX)"

## (undated) DEVICE — MASK AIRWAY SIZE 3 UNIQUE SILICON (10/BX)

## (undated) DEVICE — SYRINGE 3 CC 21 GA X 1-1/2 - NDL SAFETY (50/BX 8BX/CA)

## (undated) DEVICE — CANISTER SUCTION RIGID RED 1500CC (40EA/CA)

## (undated) DEVICE — KIT  I.V. START (100EA/CA)

## (undated) DEVICE — SENSOR OXIMETER ADULT SPO2 RD SET (20EA/BX)

## (undated) DEVICE — TUBE CONNECTING SUCTION - CLEAR PLASTIC STERILE 72 IN (50EA/CA)

## (undated) DEVICE — SODIUM CHL IRRIGATION 0.9% 1000ML (12EA/CA)

## (undated) DEVICE — LACTATED RINGERS INJ 1000 ML - (14EA/CA 60CA/PF)

## (undated) DEVICE — SET LEADWIRE 5 LEAD BEDSIDE DISPOSABLE ECG (1SET OF 5/EA)

## (undated) DEVICE — PACK VITRECTOMY 25G 20K V W (1EA/BX)

## (undated) DEVICE — CANISTER SUCTION 3000ML MECHANICAL FILTER AUTO SHUTOFF MEDI-VAC NONSTERILE LF DISP  (40EA/CA)

## (undated) DEVICE — PACK VITRECTOMY (1EA/CA)